# Patient Record
Sex: FEMALE | Race: WHITE | NOT HISPANIC OR LATINO | Employment: OTHER | URBAN - METROPOLITAN AREA
[De-identification: names, ages, dates, MRNs, and addresses within clinical notes are randomized per-mention and may not be internally consistent; named-entity substitution may affect disease eponyms.]

---

## 2024-08-05 ENCOUNTER — HOSPITAL ENCOUNTER (OUTPATIENT)
Facility: MEDICAL CENTER | Age: 74
End: 2024-08-05
Attending: ORTHOPAEDIC SURGERY | Admitting: ORTHOPAEDIC SURGERY
Payer: MEDICARE

## 2024-08-05 DIAGNOSIS — G89.18 POST-OPERATIVE PAIN: ICD-10-CM

## 2024-08-05 DIAGNOSIS — I10 HYPERTENSION, UNSPECIFIED TYPE: ICD-10-CM

## 2024-08-05 DIAGNOSIS — M17.11 PRIMARY OSTEOARTHRITIS OF RIGHT KNEE: ICD-10-CM

## 2024-08-05 DIAGNOSIS — I48.91 ATRIAL FIBRILLATION WITH RVR (HCC): ICD-10-CM

## 2024-08-05 DIAGNOSIS — I26.99 BILATERAL PULMONARY EMBOLISM (HCC): ICD-10-CM

## 2024-08-05 DIAGNOSIS — F17.200 TOBACCO DEPENDENCE: ICD-10-CM

## 2024-08-05 PROBLEM — M17.9 OSTEOARTHRITIS, KNEE: Status: ACTIVE | Noted: 2024-08-05

## 2024-08-05 NOTE — LETTER
August 22, 2024    Patient Name: Josie Vale  Surgeon Name: Federico Byrd M.D.  Surgery Facility: [facility] ([address] [place])  Surgery Date: 9/3/2024    The time of your surgery is not final and may change up to and until the day of your surgery. You will be contacted 24-48 hours prior to your surgery date with your check-in and surgery time.    If you will not be at one of the below numbers please call the surgery scheduler at [phone]  Preferred Phone: [phone]    BEFORE YOUR SURGERY   Pre Registration and/or Lab Work must be done within and no earlier than 28 days prior to your surgery date. Your scheduled facility will contact you regarding all required preregistration and/or lab work. If you have not been contacted within 7 days of your scheduled procedure please call [facility] at [phone] for an appointment as soon as possible.    DAY OF YOUR SURGERY  Nothing to eat or drink after midnight     Refrain from smoking any substance after midnight prior to surgery. Smoking may interfere with the anesthetic and frequently produces nausea during the recovery period.    Continue taking all lifesaving medications. Including the morning of your surgery with small sip of water.    Please do NOT take on the day of surgery:  Diuretics: examples- furosemide (Lasix), spironolactone, hydrochlorothiazide  ACE-inhibitors: examples- lisinopril, ramipril, enalapril  “ARBs”: examples- losartan, Olmesartan, valsartan    Please arrive at the hospital/surgery center at the check-in time provided.     An adult will need to bring you and take you home after your surgery.     AFTER YOUR SURGERY  Post op Appointment:   Date: 09/18/2024   Time: 08:45AM   With: Chad Castellanos PA-C   Location: [address] [place] [address] [place] [address]    - Therapy- Your first appointment should be 4-5  day(s) after your surgery. For your convenience we have 4 Physical Therapy  locations: [facility], [place], and [facility]. Call our office ASAP to schedule an appointment at [phone] or take the enclosed Therapy Prescription to a facility of your choice.  - No dental work for 3-6 months after your surgery.  - Post Surgery - You will need someone to drive you home  - Post Surgery - You will need someone to stay with you for 24 hours  - You must have someone provide transportation post surgery and someone to monitor you for at least 24 hours post-surgery. If you don't have either of these your appointment will be canceled.     TIME OFF WORK  FMLA or Disability forms can be faxed directly to: [phone] or you may drop them off at [address] [place] [address]. Our office charges a $35.00 fee per form. Forms will be completed within 10 business days of drop off and payment received. For the status of your forms you may contact our disability office directly [phone].    MEDICATION INSTRUCTIONS **Please read section completely**  The following medications should be stopped a minimum of 10 days prior to surgery:  All over the counter, Supplements & Herbal medications    Anorectics: Phentermine (Adipex-P, Lomaira and Suprenza), Phentermine-topiramate (Qsymia), Bupropion-naltrexone (Contrave)    **If you are on Bupropion for anxiety/depression, please continue this medication up until the day of surgery.     Opiod Partial Agonists/Opioid Antagonists: Buprenorphine (Suboxone, Belbuca, Butrans, Probuphine Implant, Sublocade), Naltrexone (ReVia, Vivitrol), Naloxone    Amphetamines: Dextroamphetamine/Amphetamine (Adderall, Mydayis), Methylphenidate Hydrochloride (Concerta, Metadate, Methylin, Ritalin)    The following medications should be stopped 5 days prior to surgery:  Blood Thinners: Any Aspirin, Aspirin products, anti-inflammatories such as ibuprofen and any blood thinners such as Coumadin and Plavix. Please consult your prescribing physician if you are on life  saving blood thinners, in regards to when to stop medications prior to surgery.     The following medications should be stopped a minimum of 3 days prior to surgery:  PDE-5 inhibitors: Sildenafil (Viagra), Tadalafil (Cialis), Vardenafil (Levitra), Avanafil (Stendra)    MAO Inhibitors: Rasagiline (Azilect), Selegiline (Eldepryl, Emsam, Selapar), Isocarboxazid (Marplan), Phenelzine (Nardil)

## 2024-08-22 PROBLEM — M17.11 PRIMARY OSTEOARTHRITIS OF RIGHT KNEE: Status: ACTIVE | Noted: 2024-08-05

## 2024-08-23 ENCOUNTER — APPOINTMENT (OUTPATIENT)
Dept: ADMISSIONS | Facility: MEDICAL CENTER | Age: 74
End: 2024-08-23
Attending: ORTHOPAEDIC SURGERY
Payer: MEDICARE

## 2024-08-28 ENCOUNTER — APPOINTMENT (OUTPATIENT)
Dept: ADMISSIONS | Facility: MEDICAL CENTER | Age: 74
End: 2024-08-28
Attending: ORTHOPAEDIC SURGERY
Payer: MEDICARE

## 2024-08-28 ENCOUNTER — PRE-ADMISSION TESTING (OUTPATIENT)
Dept: ADMISSIONS | Facility: MEDICAL CENTER | Age: 74
DRG: 469 | End: 2024-08-28
Attending: ORTHOPAEDIC SURGERY
Payer: MEDICARE

## 2024-08-28 VITALS — BODY MASS INDEX: 30.77 KG/M2 | HEIGHT: 72 IN

## 2024-08-28 DIAGNOSIS — Z01.812 PRE-OPERATIVE LABORATORY EXAMINATION: ICD-10-CM

## 2024-08-28 LAB
ERYTHROCYTE [DISTWIDTH] IN BLOOD BY AUTOMATED COUNT: 45.1 FL (ref 35.9–50)
HCT VFR BLD AUTO: 48 % (ref 37–47)
HGB BLD-MCNC: 16 G/DL (ref 12–16)
MCH RBC QN AUTO: 29.3 PG (ref 27–33)
MCHC RBC AUTO-ENTMCNC: 33.3 G/DL (ref 32.2–35.5)
MCV RBC AUTO: 87.9 FL (ref 81.4–97.8)
PLATELET # BLD AUTO: 168 K/UL (ref 164–446)
PMV BLD AUTO: 10.7 FL (ref 9–12.9)
RBC # BLD AUTO: 5.46 M/UL (ref 4.2–5.4)
SCCMEC + MECA PNL NOSE NAA+PROBE: NEGATIVE
SCCMEC + MECA PNL NOSE NAA+PROBE: NEGATIVE
WBC # BLD AUTO: 11.4 K/UL (ref 4.8–10.8)

## 2024-08-28 PROCEDURE — 87641 MR-STAPH DNA AMP PROBE: CPT

## 2024-08-28 PROCEDURE — 36415 COLL VENOUS BLD VENIPUNCTURE: CPT

## 2024-08-28 PROCEDURE — 85027 COMPLETE CBC AUTOMATED: CPT

## 2024-08-28 PROCEDURE — 87640 STAPH A DNA AMP PROBE: CPT | Mod: XU

## 2024-08-28 RX ORDER — ACETAMINOPHEN 500 MG
500-1000 TABLET ORAL EVERY 6 HOURS PRN
COMMUNITY

## 2024-08-28 NOTE — PREPROCEDURE INSTRUCTIONS
PreAdmit Telephone Appointment: Reviewed the Preparing for your procedure handout with patient over the phone. Patient instructed per pharmacy guidelines regarding taking, holding or contacting provider for instructions on regularly prescribed medications before surgery. Instructed to take the following medications the day of surgery with a sip of water per pharmacy medication guidelines:  Tylenol, claritin    Confirmed with patient where to check in morning of surgery. Handouts/Brochure/Video emailed to patient.

## 2024-08-28 NOTE — DISCHARGE PLANNING
DISCHARGE PLANNING NOTE - TOTAL JOINT    Procedure: Procedure(s):  ROBOTIC RIGHT TOTAL KNEE ARTHROPLASTY  Procedure Date: 9/3/2024  Insurance: Payor: MEDICARE / Plan: MEDICARE PART A & B / Product Type: *No Product type* /    Equipment currently available at home?  cane, crutches, front-wheel walker, raised toilet seat, shower chair, and ice machine  Steps into the home? 2  Steps within the home? 0  Toilet height? Standard  Type of shower? walk-in shower  Home Oxygen? No  Portable tank?    Oxygen Provider:  Who will be with you during your recovery? spouse  Is Outpatient Physical Therapy set up after surgery? Yes  Did you take the Total Joint Class and where? Yes, received NAON book.  Planning same day discharge?Yes     This writer met with pt and educated to preop showers, nasal mrsa swab which was obtained by this writer, and potential for overnight stay. CHG kit given to pt. Home safety checklist sent home with pt. Pt educated to dc criteria. All questions answered and verbalizes understanding of all instructions. No dc needs identified at this time. Anticipate dc to home without barriers.

## 2024-08-29 RX ORDER — DOCUSATE SODIUM 100 MG/1
100 CAPSULE, LIQUID FILLED ORAL 2 TIMES DAILY
Qty: 60 CAPSULE | Refills: 0 | Status: SHIPPED | OUTPATIENT
Start: 2024-08-29 | End: 2024-09-28

## 2024-08-29 RX ORDER — MELOXICAM 7.5 MG/1
7.5 TABLET ORAL DAILY
Qty: 30 TABLET | Refills: 0 | Status: SHIPPED | OUTPATIENT
Start: 2024-08-29 | End: 2024-09-28

## 2024-08-29 RX ORDER — ASPIRIN 81 MG/1
81 TABLET, CHEWABLE ORAL 2 TIMES DAILY
Qty: 56 TABLET | Refills: 0
Start: 2024-08-29 | End: 2024-09-26

## 2024-08-29 RX ORDER — OXYCODONE HYDROCHLORIDE 5 MG/1
5-7.5 TABLET ORAL EVERY 6 HOURS PRN
Qty: 42 TABLET | Refills: 0 | Status: SHIPPED | OUTPATIENT
Start: 2024-08-29 | End: 2024-09-05

## 2024-08-29 RX ORDER — TRAMADOL HYDROCHLORIDE 50 MG/1
50-75 TABLET ORAL EVERY 6 HOURS PRN
Qty: 35 TABLET | Refills: 0 | Status: SHIPPED | OUTPATIENT
Start: 2024-08-29 | End: 2024-09-05

## 2024-08-29 NOTE — DISCHARGE INSTRUCTIONS
Patient will be discharged home and follow up with Dr. Byrd in 2 weeks, for which the patient already has scheduled.    You may call or text Dr. Byrd' medical assistant during business hours at [phone].  You may call the emergency ADDY line during nights/weekends/holidays if needed at [phone].    Instructions:  -Leave the bandage in place until your followup appointment in 2 weeks.  -May shower with bandage in place, if bandage becomes soaked underneath please remove and call the office immediately.  -No baths/tubs/pools/submersion of the wound for now.  -Call if there is significant drainage beneath the bandage    -Put as much weight as comfortable on the operative leg.  Use a walker to assist with balance to avoid any falls.  -It is important to start moving and stretching right away, otherwise the joint can stiffen.    -Take your blood clot prevention medication for 4 weeks after surgery (81 mg of Aspirin, twice daily).  -Use ice frequently to help with pain and swelling control  -Pain management:  Standard pain regimen should be:  Ice as much as possible.  Apply the ice anywhere you feel pain.  Meloxicam (anti-inflammatory)- 7.5mg one tablet every day.  Take this automatically on a scheduled basis.  Do not take any other NSAIDs while taking this.  Tylenol (acetaminophen) - 1000mg every 8 hours. Take this automatically on a scheduled basis.  Tramadol - 1-2 capsules every 6 hours. Take this automatically on a scheduled basis until no longer needed for pain.  Oxycodone - 1-2 tablets every 6 hours only if needed.  You would take the oxycodone 3 hours after the Tramadol, such that you are taking one or the other every 3 hours        -Try to limit the amount of oxycodone since it tends to cause constipation, drowsiness, etc.  But if you need it, take it.        -100mg of Colace (docusate) will be prescribed. Take this twice a day while still taking Tramadol or Oxycodone. Can discontinue after opiates  are no longer being taken.  If bowel movement has not occurred in 48 hours please add in a laxative called Senna (over the counter) twice daily in conjunction with the colace. If no bowel movement is produced 48 hours after adding in Senna please start Milk of Magnesia (over the counter).     AGGRESSIVE RANGE OF MOTION.  Do a quad set a million times per day.  Have a family member push down on the thigh and shin every 20 minutes with 2-3 pillows under the heel to help stretch the knee straight.  Flex the knee as far as possible every 20 minutes.

## 2024-09-03 ENCOUNTER — ANESTHESIA (OUTPATIENT)
Dept: SURGERY | Facility: MEDICAL CENTER | Age: 74
DRG: 469 | End: 2024-09-03
Payer: MEDICARE

## 2024-09-03 ENCOUNTER — ANESTHESIA EVENT (OUTPATIENT)
Dept: SURGERY | Facility: MEDICAL CENTER | Age: 74
DRG: 469 | End: 2024-09-03
Payer: MEDICARE

## 2024-09-03 ENCOUNTER — APPOINTMENT (OUTPATIENT)
Dept: CARDIOLOGY | Facility: MEDICAL CENTER | Age: 74
DRG: 469 | End: 2024-09-03
Attending: INTERNAL MEDICINE
Payer: MEDICARE

## 2024-09-03 PROBLEM — R00.1 BRADYCARDIA: Status: ACTIVE | Noted: 2024-09-03

## 2024-09-03 PROBLEM — Z96.651 TOTAL KNEE REPLACEMENT STATUS, RIGHT: Status: ACTIVE | Noted: 2024-09-03

## 2024-09-03 PROBLEM — E87.1 HYPONATREMIA: Status: ACTIVE | Noted: 2024-09-03

## 2024-09-03 PROBLEM — D72.829 LEUKOCYTOSIS: Status: ACTIVE | Noted: 2024-09-03

## 2024-09-03 PROBLEM — R79.89 ELEVATED BRAIN NATRIURETIC PEPTIDE (BNP) LEVEL: Status: ACTIVE | Noted: 2024-09-03

## 2024-09-03 LAB
ANION GAP SERPL CALC-SCNC: 13 MMOL/L (ref 7–16)
BUN SERPL-MCNC: 16 MG/DL (ref 8–22)
CALCIUM SERPL-MCNC: 8.9 MG/DL (ref 8.4–10.2)
CHLORIDE SERPL-SCNC: 96 MMOL/L (ref 96–112)
CO2 SERPL-SCNC: 23 MMOL/L (ref 20–33)
CREAT SERPL-MCNC: 0.7 MG/DL (ref 0.5–1.4)
EKG IMPRESSION: NORMAL
EKG IMPRESSION: NORMAL
ERYTHROCYTE [DISTWIDTH] IN BLOOD BY AUTOMATED COUNT: 45 FL (ref 35.9–50)
GFR SERPLBLD CREATININE-BSD FMLA CKD-EPI: 91 ML/MIN/1.73 M 2
GLUCOSE SERPL-MCNC: 94 MG/DL (ref 65–99)
HCT VFR BLD AUTO: 40.3 % (ref 37–47)
HGB BLD-MCNC: 13.5 G/DL (ref 12–16)
MAGNESIUM SERPL-MCNC: 1.7 MG/DL (ref 1.5–2.5)
MCH RBC QN AUTO: 29.8 PG (ref 27–33)
MCHC RBC AUTO-ENTMCNC: 33.5 G/DL (ref 32.2–35.5)
MCV RBC AUTO: 89 FL (ref 81.4–97.8)
NT-PROBNP SERPL IA-MCNC: 1089 PG/ML (ref 0–125)
PHOSPHATE SERPL-MCNC: 3.9 MG/DL (ref 2.5–4.5)
PLATELET # BLD AUTO: 210 K/UL (ref 164–446)
PMV BLD AUTO: 9.8 FL (ref 9–12.9)
POTASSIUM SERPL-SCNC: 4.2 MMOL/L (ref 3.6–5.5)
RBC # BLD AUTO: 4.53 M/UL (ref 4.2–5.4)
SODIUM SERPL-SCNC: 132 MMOL/L (ref 135–145)
TROPONIN T SERPL-MCNC: 13 NG/L (ref 6–19)
TSH SERPL DL<=0.005 MIU/L-ACNC: 0.52 UIU/ML (ref 0.38–5.33)
WBC # BLD AUTO: 17.1 K/UL (ref 4.8–10.8)

## 2024-09-03 PROCEDURE — 770020 HCHG ROOM/CARE - TELE (206)

## 2024-09-03 PROCEDURE — 700111 HCHG RX REV CODE 636 W/ 250 OVERRIDE (IP): Performed by: ORTHOPAEDIC SURGERY

## 2024-09-03 PROCEDURE — 85027 COMPLETE CBC AUTOMATED: CPT

## 2024-09-03 PROCEDURE — 27447 TOTAL KNEE ARTHROPLASTY: CPT | Mod: RT | Performed by: ORTHOPAEDIC SURGERY

## 2024-09-03 PROCEDURE — 0SRC0JA REPLACEMENT OF RIGHT KNEE JOINT WITH SYNTHETIC SUBSTITUTE, UNCEMENTED, OPEN APPROACH: ICD-10-PCS | Performed by: ORTHOPAEDIC SURGERY

## 2024-09-03 PROCEDURE — 502000 HCHG MISC OR IMPLANTS RC 0278: Performed by: ORTHOPAEDIC SURGERY

## 2024-09-03 PROCEDURE — 80048 BASIC METABOLIC PNL TOTAL CA: CPT

## 2024-09-03 PROCEDURE — 160031 HCHG SURGERY MINUTES - 1ST 30 MINS LEVEL 5: Performed by: ORTHOPAEDIC SURGERY

## 2024-09-03 PROCEDURE — 20985 CPTR-ASST DIR MS PX: CPT | Mod: ASROC,RT | Performed by: STUDENT IN AN ORGANIZED HEALTH CARE EDUCATION/TRAINING PROGRAM

## 2024-09-03 PROCEDURE — 502714 HCHG ROBOTIC SURGERY SERVICES: Performed by: ORTHOPAEDIC SURGERY

## 2024-09-03 PROCEDURE — 8E0Y0CZ ROBOTIC ASSISTED PROCEDURE OF LOWER EXTREMITY, OPEN APPROACH: ICD-10-PCS | Performed by: ORTHOPAEDIC SURGERY

## 2024-09-03 PROCEDURE — 93010 ELECTROCARDIOGRAM REPORT: CPT | Performed by: STUDENT IN AN ORGANIZED HEALTH CARE EDUCATION/TRAINING PROGRAM

## 2024-09-03 PROCEDURE — 700111 HCHG RX REV CODE 636 W/ 250 OVERRIDE (IP): Mod: JZ | Performed by: INTERNAL MEDICINE

## 2024-09-03 PROCEDURE — 84443 ASSAY THYROID STIM HORMONE: CPT

## 2024-09-03 PROCEDURE — 700102 HCHG RX REV CODE 250 W/ 637 OVERRIDE(OP): Performed by: ANESTHESIOLOGY

## 2024-09-03 PROCEDURE — 160009 HCHG ANES TIME/MIN: Performed by: ORTHOPAEDIC SURGERY

## 2024-09-03 PROCEDURE — 700101 HCHG RX REV CODE 250: Performed by: ANESTHESIOLOGY

## 2024-09-03 PROCEDURE — 83880 ASSAY OF NATRIURETIC PEPTIDE: CPT

## 2024-09-03 PROCEDURE — 93005 ELECTROCARDIOGRAM TRACING: CPT | Performed by: HOSPITALIST

## 2024-09-03 PROCEDURE — 700111 HCHG RX REV CODE 636 W/ 250 OVERRIDE (IP): Performed by: STUDENT IN AN ORGANIZED HEALTH CARE EDUCATION/TRAINING PROGRAM

## 2024-09-03 PROCEDURE — A9270 NON-COVERED ITEM OR SERVICE: HCPCS | Performed by: HOSPITALIST

## 2024-09-03 PROCEDURE — 93005 ELECTROCARDIOGRAM TRACING: CPT | Performed by: ANESTHESIOLOGY

## 2024-09-03 PROCEDURE — 700102 HCHG RX REV CODE 250 W/ 637 OVERRIDE(OP): Performed by: INTERNAL MEDICINE

## 2024-09-03 PROCEDURE — 700105 HCHG RX REV CODE 258: Performed by: ORTHOPAEDIC SURGERY

## 2024-09-03 PROCEDURE — A9270 NON-COVERED ITEM OR SERVICE: HCPCS | Performed by: INTERNAL MEDICINE

## 2024-09-03 PROCEDURE — 160035 HCHG PACU - 1ST 60 MINS PHASE I: Performed by: ORTHOPAEDIC SURGERY

## 2024-09-03 PROCEDURE — 700105 HCHG RX REV CODE 258: Performed by: INTERNAL MEDICINE

## 2024-09-03 PROCEDURE — 700102 HCHG RX REV CODE 250 W/ 637 OVERRIDE(OP): Performed by: ORTHOPAEDIC SURGERY

## 2024-09-03 PROCEDURE — 94760 N-INVAS EAR/PLS OXIMETRY 1: CPT

## 2024-09-03 PROCEDURE — 3E0T3BZ INTRODUCTION OF ANESTHETIC AGENT INTO PERIPHERAL NERVES AND PLEXI, PERCUTANEOUS APPROACH: ICD-10-PCS | Performed by: ORTHOPAEDIC SURGERY

## 2024-09-03 PROCEDURE — C1776 JOINT DEVICE (IMPLANTABLE): HCPCS | Performed by: ORTHOPAEDIC SURGERY

## 2024-09-03 PROCEDURE — 99204 OFFICE O/P NEW MOD 45 MIN: CPT | Mod: 25 | Performed by: HOSPITALIST

## 2024-09-03 PROCEDURE — 700111 HCHG RX REV CODE 636 W/ 250 OVERRIDE (IP): Mod: JZ | Performed by: ANESTHESIOLOGY

## 2024-09-03 PROCEDURE — C1713 ANCHOR/SCREW BN/BN,TIS/BN: HCPCS | Performed by: ORTHOPAEDIC SURGERY

## 2024-09-03 PROCEDURE — 96365 THER/PROPH/DIAG IV INF INIT: CPT

## 2024-09-03 PROCEDURE — 83735 ASSAY OF MAGNESIUM: CPT

## 2024-09-03 PROCEDURE — 20985 CPTR-ASST DIR MS PX: CPT | Mod: RT | Performed by: ORTHOPAEDIC SURGERY

## 2024-09-03 PROCEDURE — 99406 BEHAV CHNG SMOKING 3-10 MIN: CPT | Performed by: HOSPITALIST

## 2024-09-03 PROCEDURE — 160002 HCHG RECOVERY MINUTES (STAT): Performed by: ORTHOPAEDIC SURGERY

## 2024-09-03 PROCEDURE — 84100 ASSAY OF PHOSPHORUS: CPT

## 2024-09-03 PROCEDURE — 160048 HCHG OR STATISTICAL LEVEL 1-5: Performed by: ORTHOPAEDIC SURGERY

## 2024-09-03 PROCEDURE — 99222 1ST HOSP IP/OBS MODERATE 55: CPT | Performed by: INTERNAL MEDICINE

## 2024-09-03 PROCEDURE — A9270 NON-COVERED ITEM OR SERVICE: HCPCS | Performed by: ORTHOPAEDIC SURGERY

## 2024-09-03 PROCEDURE — 700102 HCHG RX REV CODE 250 W/ 637 OVERRIDE(OP): Performed by: HOSPITALIST

## 2024-09-03 PROCEDURE — 36415 COLL VENOUS BLD VENIPUNCTURE: CPT

## 2024-09-03 PROCEDURE — 27447 TOTAL KNEE ARTHROPLASTY: CPT | Mod: ASROC,RT | Performed by: STUDENT IN AN ORGANIZED HEALTH CARE EDUCATION/TRAINING PROGRAM

## 2024-09-03 PROCEDURE — A9270 NON-COVERED ITEM OR SERVICE: HCPCS | Performed by: ANESTHESIOLOGY

## 2024-09-03 PROCEDURE — 84484 ASSAY OF TROPONIN QUANT: CPT

## 2024-09-03 PROCEDURE — 160036 HCHG PACU - EA ADDL 30 MINS PHASE I: Performed by: ORTHOPAEDIC SURGERY

## 2024-09-03 PROCEDURE — 64447 NJX AA&/STRD FEMORAL NRV IMG: CPT | Performed by: ORTHOPAEDIC SURGERY

## 2024-09-03 PROCEDURE — 160042 HCHG SURGERY MINUTES - EA ADDL 1 MIN LEVEL 5: Performed by: ORTHOPAEDIC SURGERY

## 2024-09-03 PROCEDURE — 700101 HCHG RX REV CODE 250

## 2024-09-03 DEVICE — IMPLANTABLE DEVICE: Type: IMPLANTABLE DEVICE | Site: KNEE | Status: FUNCTIONAL

## 2024-09-03 DEVICE — BASEPLATE TIBIAL TRIATHLON TRITANIUM SIZE 4 (1EA): Type: IMPLANTABLE DEVICE | Site: KNEE | Status: FUNCTIONAL

## 2024-09-03 DEVICE — COMPONENT PATELLAR TRIATHLON TRITANIUM ASYMMETRIC METAL BACKED PATELLA A38X11MM (1EA): Type: IMPLANTABLE DEVICE | Site: KNEE | Status: FUNCTIONAL

## 2024-09-03 DEVICE — COMPONENT FEMORAL TRIATHLON CRUCIATE RETAIN RIGHT SIZE 4 (1EA): Type: IMPLANTABLE DEVICE | Site: KNEE | Status: FUNCTIONAL

## 2024-09-03 RX ORDER — HYDROMORPHONE HYDROCHLORIDE 1 MG/ML
0.1 INJECTION, SOLUTION INTRAMUSCULAR; INTRAVENOUS; SUBCUTANEOUS
Status: DISCONTINUED | OUTPATIENT
Start: 2024-09-03 | End: 2024-09-03 | Stop reason: HOSPADM

## 2024-09-03 RX ORDER — TRAMADOL HYDROCHLORIDE 50 MG/1
50 TABLET ORAL EVERY 4 HOURS PRN
Status: DISCONTINUED | OUTPATIENT
Start: 2024-09-03 | End: 2024-09-08 | Stop reason: HOSPADM

## 2024-09-03 RX ORDER — EPHEDRINE SULFATE 50 MG/ML
INJECTION, SOLUTION INTRAVENOUS PRN
Status: DISCONTINUED | OUTPATIENT
Start: 2024-09-03 | End: 2024-09-03 | Stop reason: SURG

## 2024-09-03 RX ORDER — OXYCODONE HCL 5 MG/5 ML
5 SOLUTION, ORAL ORAL
Status: DISCONTINUED | OUTPATIENT
Start: 2024-09-03 | End: 2024-09-03 | Stop reason: HOSPADM

## 2024-09-03 RX ORDER — ASPIRIN 81 MG/1
81 TABLET ORAL 2 TIMES DAILY
Status: DISCONTINUED | OUTPATIENT
Start: 2024-09-03 | End: 2024-09-05

## 2024-09-03 RX ORDER — METOPROLOL TARTRATE 1 MG/ML
2.5 INJECTION, SOLUTION INTRAVENOUS
Status: COMPLETED | OUTPATIENT
Start: 2024-09-03 | End: 2024-09-03

## 2024-09-03 RX ORDER — DEXAMETHASONE SODIUM PHOSPHATE 4 MG/ML
4 INJECTION, SOLUTION INTRA-ARTICULAR; INTRALESIONAL; INTRAMUSCULAR; INTRAVENOUS; SOFT TISSUE
Status: DISCONTINUED | OUTPATIENT
Start: 2024-09-03 | End: 2024-09-08 | Stop reason: HOSPADM

## 2024-09-03 RX ORDER — METOPROLOL TARTRATE 25 MG/1
25 TABLET, FILM COATED ORAL 2 TIMES DAILY
Status: DISCONTINUED | OUTPATIENT
Start: 2024-09-03 | End: 2024-09-04

## 2024-09-03 RX ORDER — HYDROMORPHONE HYDROCHLORIDE 1 MG/ML
0.5 INJECTION, SOLUTION INTRAMUSCULAR; INTRAVENOUS; SUBCUTANEOUS
Status: DISCONTINUED | OUTPATIENT
Start: 2024-09-03 | End: 2024-09-08 | Stop reason: HOSPADM

## 2024-09-03 RX ORDER — KETOROLAC TROMETHAMINE 15 MG/ML
15 INJECTION, SOLUTION INTRAMUSCULAR; INTRAVENOUS EVERY 6 HOURS
Status: DISCONTINUED | OUTPATIENT
Start: 2024-09-03 | End: 2024-09-05

## 2024-09-03 RX ORDER — ACETAMINOPHEN 500 MG
1000 TABLET ORAL EVERY 6 HOURS
Status: COMPLETED | OUTPATIENT
Start: 2024-09-03 | End: 2024-09-07

## 2024-09-03 RX ORDER — CELECOXIB 200 MG/1
200 CAPSULE ORAL ONCE
Status: COMPLETED | OUTPATIENT
Start: 2024-09-03 | End: 2024-09-03

## 2024-09-03 RX ORDER — METOPROLOL TARTRATE 25 MG/1
25 TABLET, FILM COATED ORAL ONCE
Status: DISCONTINUED | OUTPATIENT
Start: 2024-09-03 | End: 2024-09-03

## 2024-09-03 RX ORDER — SODIUM CHLORIDE, SODIUM LACTATE, POTASSIUM CHLORIDE, CALCIUM CHLORIDE 600; 310; 30; 20 MG/100ML; MG/100ML; MG/100ML; MG/100ML
INJECTION, SOLUTION INTRAVENOUS CONTINUOUS
Status: DISCONTINUED | OUTPATIENT
Start: 2024-09-03 | End: 2024-09-03 | Stop reason: HOSPADM

## 2024-09-03 RX ORDER — DEXTROSE MONOHYDRATE 50 MG/ML
INJECTION, SOLUTION INTRAVENOUS CONTINUOUS
Status: DISCONTINUED | OUTPATIENT
Start: 2024-09-03 | End: 2024-09-03

## 2024-09-03 RX ORDER — DIPHENHYDRAMINE HYDROCHLORIDE 50 MG/ML
25 INJECTION INTRAMUSCULAR; INTRAVENOUS EVERY 6 HOURS PRN
Status: DISCONTINUED | OUTPATIENT
Start: 2024-09-03 | End: 2024-09-08 | Stop reason: HOSPADM

## 2024-09-03 RX ORDER — METOPROLOL TARTRATE 1 MG/ML
INJECTION, SOLUTION INTRAVENOUS
Status: COMPLETED
Start: 2024-09-03 | End: 2024-09-03

## 2024-09-03 RX ORDER — IBUPROFEN 400 MG/1
800 TABLET, FILM COATED ORAL 3 TIMES DAILY PRN
Status: DISCONTINUED | OUTPATIENT
Start: 2024-09-06 | End: 2024-09-05

## 2024-09-03 RX ORDER — ACETAMINOPHEN 500 MG
1000 TABLET ORAL EVERY 6 HOURS PRN
Status: DISCONTINUED | OUTPATIENT
Start: 2024-09-08 | End: 2024-09-03

## 2024-09-03 RX ORDER — SCOLOPAMINE TRANSDERMAL SYSTEM 1 MG/1
1 PATCH, EXTENDED RELEASE TRANSDERMAL
Status: DISCONTINUED | OUTPATIENT
Start: 2024-09-03 | End: 2024-09-08 | Stop reason: HOSPADM

## 2024-09-03 RX ORDER — ACETAMINOPHEN 10 MG/ML
1000 INJECTION, SOLUTION INTRAVENOUS EVERY 6 HOURS
Status: DISCONTINUED | OUTPATIENT
Start: 2024-09-03 | End: 2024-09-03

## 2024-09-03 RX ORDER — HYDROMORPHONE HYDROCHLORIDE 1 MG/ML
0.2 INJECTION, SOLUTION INTRAMUSCULAR; INTRAVENOUS; SUBCUTANEOUS
Status: DISCONTINUED | OUTPATIENT
Start: 2024-09-03 | End: 2024-09-03 | Stop reason: HOSPADM

## 2024-09-03 RX ORDER — DOCUSATE SODIUM 100 MG/1
100 CAPSULE, LIQUID FILLED ORAL 2 TIMES DAILY
Status: DISCONTINUED | OUTPATIENT
Start: 2024-09-03 | End: 2024-09-08 | Stop reason: HOSPADM

## 2024-09-03 RX ORDER — ONDANSETRON 2 MG/ML
4 INJECTION INTRAMUSCULAR; INTRAVENOUS EVERY 4 HOURS PRN
Status: DISCONTINUED | OUTPATIENT
Start: 2024-09-03 | End: 2024-09-08 | Stop reason: HOSPADM

## 2024-09-03 RX ORDER — ACETAMINOPHEN 500 MG
1000 TABLET ORAL ONCE
Status: COMPLETED | OUTPATIENT
Start: 2024-09-03 | End: 2024-09-03

## 2024-09-03 RX ORDER — SODIUM CHLORIDE, SODIUM LACTATE, POTASSIUM CHLORIDE, CALCIUM CHLORIDE 600; 310; 30; 20 MG/100ML; MG/100ML; MG/100ML; MG/100ML
INJECTION, SOLUTION INTRAVENOUS CONTINUOUS
Status: ACTIVE | OUTPATIENT
Start: 2024-09-03 | End: 2024-09-03

## 2024-09-03 RX ORDER — HYDROMORPHONE HYDROCHLORIDE 2 MG/ML
INJECTION, SOLUTION INTRAMUSCULAR; INTRAVENOUS; SUBCUTANEOUS PRN
Status: DISCONTINUED | OUTPATIENT
Start: 2024-09-03 | End: 2024-09-03 | Stop reason: SURG

## 2024-09-03 RX ORDER — HYDROMORPHONE HYDROCHLORIDE 1 MG/ML
0.4 INJECTION, SOLUTION INTRAMUSCULAR; INTRAVENOUS; SUBCUTANEOUS
Status: DISCONTINUED | OUTPATIENT
Start: 2024-09-03 | End: 2024-09-03 | Stop reason: HOSPADM

## 2024-09-03 RX ORDER — EPINEPHRINE 1 MG/ML(1)
AMPUL (ML) INJECTION
Status: DISCONTINUED | OUTPATIENT
Start: 2024-09-03 | End: 2024-09-03 | Stop reason: HOSPADM

## 2024-09-03 RX ORDER — DEXAMETHASONE SODIUM PHOSPHATE 4 MG/ML
INJECTION, SOLUTION INTRA-ARTICULAR; INTRALESIONAL; INTRAMUSCULAR; INTRAVENOUS; SOFT TISSUE PRN
Status: DISCONTINUED | OUTPATIENT
Start: 2024-09-03 | End: 2024-09-03 | Stop reason: SURG

## 2024-09-03 RX ORDER — ACETAMINOPHEN 500 MG
1000 TABLET ORAL EVERY 6 HOURS
Status: DISCONTINUED | OUTPATIENT
Start: 2024-09-04 | End: 2024-09-03

## 2024-09-03 RX ORDER — ONDANSETRON 2 MG/ML
INJECTION INTRAMUSCULAR; INTRAVENOUS PRN
Status: DISCONTINUED | OUTPATIENT
Start: 2024-09-03 | End: 2024-09-03 | Stop reason: SURG

## 2024-09-03 RX ORDER — AMOXICILLIN 250 MG
1 CAPSULE ORAL NIGHTLY
Status: DISCONTINUED | OUTPATIENT
Start: 2024-09-03 | End: 2024-09-08 | Stop reason: HOSPADM

## 2024-09-03 RX ORDER — OXYCODONE HCL 5 MG/5 ML
10 SOLUTION, ORAL ORAL
Status: DISCONTINUED | OUTPATIENT
Start: 2024-09-03 | End: 2024-09-03 | Stop reason: HOSPADM

## 2024-09-03 RX ORDER — TRANEXAMIC ACID 100 MG/ML
INJECTION, SOLUTION INTRAVENOUS PRN
Status: DISCONTINUED | OUTPATIENT
Start: 2024-09-03 | End: 2024-09-03 | Stop reason: SURG

## 2024-09-03 RX ORDER — BUPIVACAINE HYDROCHLORIDE AND EPINEPHRINE 2.5; 5 MG/ML; UG/ML
INJECTION, SOLUTION EPIDURAL; INFILTRATION; INTRACAUDAL; PERINEURAL PRN
Status: DISCONTINUED | OUTPATIENT
Start: 2024-09-03 | End: 2024-09-03 | Stop reason: SURG

## 2024-09-03 RX ORDER — OXYCODONE HYDROCHLORIDE 10 MG/1
10 TABLET ORAL
Status: DISCONTINUED | OUTPATIENT
Start: 2024-09-03 | End: 2024-09-08 | Stop reason: HOSPADM

## 2024-09-03 RX ORDER — HALOPERIDOL 5 MG/ML
1 INJECTION INTRAMUSCULAR EVERY 6 HOURS PRN
Status: DISCONTINUED | OUTPATIENT
Start: 2024-09-03 | End: 2024-09-08 | Stop reason: HOSPADM

## 2024-09-03 RX ORDER — ACETAMINOPHEN 500 MG
1000 TABLET ORAL EVERY 6 HOURS PRN
Status: DISCONTINUED | OUTPATIENT
Start: 2024-09-08 | End: 2024-09-08 | Stop reason: HOSPADM

## 2024-09-03 RX ORDER — ASPIRIN 81 MG/1
81 TABLET, CHEWABLE ORAL ONCE
Status: COMPLETED | OUTPATIENT
Start: 2024-09-03 | End: 2024-09-03

## 2024-09-03 RX ORDER — NICOTINE 21 MG/24HR
1 PATCH, TRANSDERMAL 24 HOURS TRANSDERMAL
Status: DISCONTINUED | OUTPATIENT
Start: 2024-09-03 | End: 2024-09-08 | Stop reason: HOSPADM

## 2024-09-03 RX ORDER — METOPROLOL TARTRATE 1 MG/ML
2 INJECTION, SOLUTION INTRAVENOUS
Status: COMPLETED | OUTPATIENT
Start: 2024-09-03 | End: 2024-09-03

## 2024-09-03 RX ORDER — BISACODYL 10 MG
10 SUPPOSITORY, RECTAL RECTAL
Status: DISCONTINUED | OUTPATIENT
Start: 2024-09-03 | End: 2024-09-08 | Stop reason: HOSPADM

## 2024-09-03 RX ORDER — POLYETHYLENE GLYCOL 3350 17 G/17G
1 POWDER, FOR SOLUTION ORAL 2 TIMES DAILY PRN
Status: DISCONTINUED | OUTPATIENT
Start: 2024-09-03 | End: 2024-09-08 | Stop reason: HOSPADM

## 2024-09-03 RX ORDER — ROPIVACAINE HYDROCHLORIDE 5 MG/ML
INJECTION, SOLUTION EPIDURAL; INFILTRATION; PERINEURAL
Status: DISCONTINUED | OUTPATIENT
Start: 2024-09-03 | End: 2024-09-03 | Stop reason: HOSPADM

## 2024-09-03 RX ORDER — CEFAZOLIN SODIUM 1 G/3ML
2 INJECTION, POWDER, FOR SOLUTION INTRAMUSCULAR; INTRAVENOUS ONCE
Status: COMPLETED | OUTPATIENT
Start: 2024-09-03 | End: 2024-09-03

## 2024-09-03 RX ORDER — DIPHENHYDRAMINE HCL 25 MG
25 TABLET ORAL EVERY 6 HOURS PRN
Status: DISCONTINUED | OUTPATIENT
Start: 2024-09-03 | End: 2024-09-08 | Stop reason: HOSPADM

## 2024-09-03 RX ORDER — DIPHENHYDRAMINE HCL 25 MG
25 TABLET ORAL NIGHTLY PRN
Status: DISCONTINUED | OUTPATIENT
Start: 2024-09-04 | End: 2024-09-08 | Stop reason: HOSPADM

## 2024-09-03 RX ORDER — OXYCODONE HYDROCHLORIDE 5 MG/1
5 TABLET ORAL
Status: DISCONTINUED | OUTPATIENT
Start: 2024-09-03 | End: 2024-09-08 | Stop reason: HOSPADM

## 2024-09-03 RX ORDER — LIDOCAINE HYDROCHLORIDE 20 MG/ML
INJECTION, SOLUTION EPIDURAL; INFILTRATION; INTRACAUDAL; PERINEURAL PRN
Status: DISCONTINUED | OUTPATIENT
Start: 2024-09-03 | End: 2024-09-03 | Stop reason: SURG

## 2024-09-03 RX ORDER — HALOPERIDOL 5 MG/ML
1 INJECTION INTRAMUSCULAR
Status: DISCONTINUED | OUTPATIENT
Start: 2024-09-03 | End: 2024-09-03 | Stop reason: HOSPADM

## 2024-09-03 RX ORDER — METOPROLOL TARTRATE 1 MG/ML
3 INJECTION, SOLUTION INTRAVENOUS
Status: COMPLETED | OUTPATIENT
Start: 2024-09-03 | End: 2024-09-03

## 2024-09-03 RX ORDER — SODIUM PHOSPHATE,MONO-DIBASIC 19G-7G/118
1 ENEMA (ML) RECTAL
Status: DISCONTINUED | OUTPATIENT
Start: 2024-09-03 | End: 2024-09-08 | Stop reason: HOSPADM

## 2024-09-03 RX ORDER — KETOROLAC TROMETHAMINE 30 MG/ML
INJECTION, SOLUTION INTRAMUSCULAR; INTRAVENOUS
Status: DISCONTINUED | OUTPATIENT
Start: 2024-09-03 | End: 2024-09-03 | Stop reason: HOSPADM

## 2024-09-03 RX ORDER — ONDANSETRON 2 MG/ML
4 INJECTION INTRAMUSCULAR; INTRAVENOUS
Status: DISCONTINUED | OUTPATIENT
Start: 2024-09-03 | End: 2024-09-03 | Stop reason: HOSPADM

## 2024-09-03 RX ORDER — VANCOMYCIN HYDROCHLORIDE 1 G/20ML
INJECTION, POWDER, LYOPHILIZED, FOR SOLUTION INTRAVENOUS
Status: COMPLETED | OUTPATIENT
Start: 2024-09-03 | End: 2024-09-03

## 2024-09-03 RX ORDER — AMOXICILLIN 250 MG
1 CAPSULE ORAL
Status: DISCONTINUED | OUTPATIENT
Start: 2024-09-03 | End: 2024-09-08 | Stop reason: HOSPADM

## 2024-09-03 RX ORDER — LABETALOL HYDROCHLORIDE 5 MG/ML
5 INJECTION, SOLUTION INTRAVENOUS
Status: DISCONTINUED | OUTPATIENT
Start: 2024-09-03 | End: 2024-09-03 | Stop reason: HOSPADM

## 2024-09-03 RX ORDER — HYDRALAZINE HYDROCHLORIDE 20 MG/ML
5 INJECTION INTRAMUSCULAR; INTRAVENOUS
Status: DISCONTINUED | OUTPATIENT
Start: 2024-09-03 | End: 2024-09-03 | Stop reason: HOSPADM

## 2024-09-03 RX ADMIN — TRANEXAMIC ACID 1000 MG: 100 INJECTION, SOLUTION INTRAVENOUS at 09:36

## 2024-09-03 RX ADMIN — CEFAZOLIN 2 G: 2 INJECTION, POWDER, FOR SOLUTION INTRAMUSCULAR; INTRAVENOUS at 13:24

## 2024-09-03 RX ADMIN — NICOTINE TRANSDERMAL SYSTEM 21 MG: 21 PATCH, EXTENDED RELEASE TRANSDERMAL at 23:28

## 2024-09-03 RX ADMIN — CEFAZOLIN 2 G: 1 INJECTION, POWDER, FOR SOLUTION INTRAMUSCULAR; INTRAVENOUS at 08:48

## 2024-09-03 RX ADMIN — TRANEXAMIC ACID 1000 MG: 100 INJECTION, SOLUTION INTRAVENOUS at 08:48

## 2024-09-03 RX ADMIN — LIDOCAINE HYDROCHLORIDE 60 MG: 20 INJECTION, SOLUTION EPIDURAL; INFILTRATION; INTRACAUDAL at 08:48

## 2024-09-03 RX ADMIN — SENNOSIDES AND DOCUSATE SODIUM 1 TABLET: 50; 8.6 TABLET ORAL at 20:19

## 2024-09-03 RX ADMIN — KETOROLAC TROMETHAMINE 15 MG: 15 INJECTION, SOLUTION INTRAMUSCULAR; INTRAVENOUS at 23:28

## 2024-09-03 RX ADMIN — ASPIRIN 81 MG: 81 TABLET, COATED ORAL at 21:25

## 2024-09-03 RX ADMIN — FENTANYL CITRATE 50 MCG: 50 INJECTION, SOLUTION INTRAMUSCULAR; INTRAVENOUS at 08:59

## 2024-09-03 RX ADMIN — SODIUM CHLORIDE, POTASSIUM CHLORIDE, SODIUM LACTATE AND CALCIUM CHLORIDE: 600; 310; 30; 20 INJECTION, SOLUTION INTRAVENOUS at 08:43

## 2024-09-03 RX ADMIN — ACETAMINOPHEN 1000 MG: 500 TABLET ORAL at 23:28

## 2024-09-03 RX ADMIN — AMIODARONE HYDROCHLORIDE 1 MG/MIN: 1.8 INJECTION, SOLUTION INTRAVENOUS at 15:07

## 2024-09-03 RX ADMIN — PROPOFOL 160 MG: 10 INJECTION, EMULSION INTRAVENOUS at 08:48

## 2024-09-03 RX ADMIN — METOPROLOL TARTRATE 2 MG: 5 INJECTION INTRAVENOUS at 10:15

## 2024-09-03 RX ADMIN — METOPROLOL TARTRATE 2.5 MG: 5 INJECTION INTRAVENOUS at 11:10

## 2024-09-03 RX ADMIN — KETOROLAC TROMETHAMINE 15 MG: 15 INJECTION, SOLUTION INTRAMUSCULAR; INTRAVENOUS at 18:14

## 2024-09-03 RX ADMIN — FENTANYL CITRATE 50 MCG: 50 INJECTION, SOLUTION INTRAMUSCULAR; INTRAVENOUS at 08:55

## 2024-09-03 RX ADMIN — METOPROLOL TARTRATE 2.5 MG: 5 INJECTION INTRAVENOUS at 12:13

## 2024-09-03 RX ADMIN — HYDROMORPHONE HYDROCHLORIDE 1 MG: 2 INJECTION INTRAMUSCULAR; INTRAVENOUS; SUBCUTANEOUS at 09:01

## 2024-09-03 RX ADMIN — AMIODARONE HYDROCHLORIDE 150 MG: 1.5 INJECTION, SOLUTION INTRAVENOUS at 14:46

## 2024-09-03 RX ADMIN — ONDANSETRON 4 MG: 2 INJECTION INTRAMUSCULAR; INTRAVENOUS at 09:36

## 2024-09-03 RX ADMIN — FENTANYL CITRATE 50 MCG: 50 INJECTION, SOLUTION INTRAMUSCULAR; INTRAVENOUS at 08:52

## 2024-09-03 RX ADMIN — CELECOXIB 200 MG: 200 CAPSULE ORAL at 07:57

## 2024-09-03 RX ADMIN — BUPIVACAINE HYDROCHLORIDE AND EPINEPHRINE BITARTRATE 30 ML: 2.5; .0091 INJECTION, SOLUTION EPIDURAL; INFILTRATION; INTRACAUDAL; PERINEURAL at 08:27

## 2024-09-03 RX ADMIN — ASPIRIN 81 MG 81 MG: 81 TABLET ORAL at 14:46

## 2024-09-03 RX ADMIN — DEXTROSE MONOHYDRATE: 50 INJECTION, SOLUTION INTRAVENOUS at 14:44

## 2024-09-03 RX ADMIN — PROPOFOL 40 MG: 10 INJECTION, EMULSION INTRAVENOUS at 09:00

## 2024-09-03 RX ADMIN — DEXAMETHASONE SODIUM PHOSPHATE 4 MG: 4 INJECTION INTRA-ARTICULAR; INTRALESIONAL; INTRAMUSCULAR; INTRAVENOUS; SOFT TISSUE at 08:53

## 2024-09-03 RX ADMIN — METOPROLOL TARTRATE 2.5 MG: 5 INJECTION INTRAVENOUS at 11:41

## 2024-09-03 RX ADMIN — FENTANYL CITRATE 50 MCG: 50 INJECTION, SOLUTION INTRAMUSCULAR; INTRAVENOUS at 08:48

## 2024-09-03 RX ADMIN — METOPROLOL TARTRATE 25 MG: 25 TABLET, FILM COATED ORAL at 12:22

## 2024-09-03 RX ADMIN — METOPROLOL TARTRATE 2.5 MG: 5 INJECTION INTRAVENOUS at 10:31

## 2024-09-03 RX ADMIN — EPHEDRINE SULFATE 10 MG: 50 INJECTION, SOLUTION INTRAVENOUS at 09:36

## 2024-09-03 RX ADMIN — METOPROLOL TARTRATE 2 MG: 1 INJECTION, SOLUTION INTRAVENOUS at 10:15

## 2024-09-03 RX ADMIN — DOCUSATE SODIUM 100 MG: 100 CAPSULE, LIQUID FILLED ORAL at 18:14

## 2024-09-03 RX ADMIN — ACETAMINOPHEN 1000 MG: 500 TABLET ORAL at 07:57

## 2024-09-03 RX ADMIN — METOPROLOL TARTRATE 3 MG: 5 INJECTION INTRAVENOUS at 10:20

## 2024-09-03 RX ADMIN — ACETAMINOPHEN 1000 MG: 500 TABLET ORAL at 16:27

## 2024-09-03 SDOH — ECONOMIC STABILITY: TRANSPORTATION INSECURITY
IN THE PAST 12 MONTHS, HAS THE LACK OF TRANSPORTATION KEPT YOU FROM MEDICAL APPOINTMENTS OR FROM GETTING MEDICATIONS?: NO

## 2024-09-03 SDOH — ECONOMIC STABILITY: TRANSPORTATION INSECURITY
IN THE PAST 12 MONTHS, HAS LACK OF RELIABLE TRANSPORTATION KEPT YOU FROM MEDICAL APPOINTMENTS, MEETINGS, WORK OR FROM GETTING THINGS NEEDED FOR DAILY LIVING?: NO

## 2024-09-03 ASSESSMENT — COGNITIVE AND FUNCTIONAL STATUS - GENERAL
WALKING IN HOSPITAL ROOM: A LITTLE
CLIMB 3 TO 5 STEPS WITH RAILING: A LITTLE
SUGGESTED CMS G CODE MODIFIER MOBILITY: CJ
STANDING UP FROM CHAIR USING ARMS: A LITTLE
MOBILITY SCORE: 20
MOVING TO AND FROM BED TO CHAIR: A LITTLE
DRESSING REGULAR LOWER BODY CLOTHING: A LITTLE
DAILY ACTIVITIY SCORE: 23
SUGGESTED CMS G CODE MODIFIER DAILY ACTIVITY: CI

## 2024-09-03 ASSESSMENT — SOCIAL DETERMINANTS OF HEALTH (SDOH)
IN THE PAST 12 MONTHS, HAS THE ELECTRIC, GAS, OIL, OR WATER COMPANY THREATENED TO SHUT OFF SERVICE IN YOUR HOME?: NO
IN THE PAST 12 MONTHS, HAS THE ELECTRIC, GAS, OIL, OR WATER COMPANY THREATENED TO SHUT OFF SERVICE IN YOUR HOME?: NO
WITHIN THE PAST 12 MONTHS, THE FOOD YOU BOUGHT JUST DIDN'T LAST AND YOU DIDN'T HAVE MONEY TO GET MORE: NEVER TRUE
WITHIN THE PAST 12 MONTHS, YOU WORRIED THAT YOUR FOOD WOULD RUN OUT BEFORE YOU GOT THE MONEY TO BUY MORE: NEVER TRUE
WITHIN THE PAST 12 MONTHS, THE FOOD YOU BOUGHT JUST DIDN'T LAST AND YOU DIDN'T HAVE MONEY TO GET MORE: NEVER TRUE
WITHIN THE LAST YEAR, HAVE YOU BEEN AFRAID OF YOUR PARTNER OR EX-PARTNER?: NO
WITHIN THE PAST 12 MONTHS, YOU WORRIED THAT YOUR FOOD WOULD RUN OUT BEFORE YOU GOT THE MONEY TO BUY MORE: NEVER TRUE
WITHIN THE LAST YEAR, HAVE YOU BEEN KICKED, HIT, SLAPPED, OR OTHERWISE PHYSICALLY HURT BY YOUR PARTNER OR EX-PARTNER?: NO
WITHIN THE LAST YEAR, HAVE YOU BEEN HUMILIATED OR EMOTIONALLY ABUSED IN OTHER WAYS BY YOUR PARTNER OR EX-PARTNER?: NO
WITHIN THE LAST YEAR, HAVE TO BEEN RAPED OR FORCED TO HAVE ANY KIND OF SEXUAL ACTIVITY BY YOUR PARTNER OR EX-PARTNER?: NO

## 2024-09-03 ASSESSMENT — ENCOUNTER SYMPTOMS
BRUISES/BLEEDS EASILY: 0
VOMITING: 0
HEADACHES: 0
NAUSEA: 0
DOUBLE VISION: 0
INSOMNIA: 0
DIZZINESS: 0
FEVER: 0
PALPITATIONS: 0
SHORTNESS OF BREATH: 0
COUGH: 0
MYALGIAS: 0
DEPRESSION: 0
BLURRED VISION: 0
NECK PAIN: 0
SORE THROAT: 0
WEAKNESS: 0

## 2024-09-03 ASSESSMENT — LIFESTYLE VARIABLES
TOTAL SCORE: 0
EVER HAD A DRINK FIRST THING IN THE MORNING TO STEADY YOUR NERVES TO GET RID OF A HANGOVER: NO
HAVE YOU EVER FELT YOU SHOULD CUT DOWN ON YOUR DRINKING: NO
HOW MANY TIMES IN THE PAST YEAR HAVE YOU HAD 5 OR MORE DRINKS IN A DAY: 0
DOES PATIENT WANT TO STOP DRINKING: NO
EVER FELT BAD OR GUILTY ABOUT YOUR DRINKING: NO
TOTAL SCORE: 0
ON A TYPICAL DAY WHEN YOU DRINK ALCOHOL HOW MANY DRINKS DO YOU HAVE: 0
TOTAL SCORE: 0
ALCOHOL_USE: YES
CONSUMPTION TOTAL: NEGATIVE
AVERAGE NUMBER OF DAYS PER WEEK YOU HAVE A DRINK CONTAINING ALCOHOL: 1
HAVE PEOPLE ANNOYED YOU BY CRITICIZING YOUR DRINKING: NO

## 2024-09-03 ASSESSMENT — PAIN SCALES - GENERAL: PAIN_LEVEL: 1

## 2024-09-03 ASSESSMENT — PATIENT HEALTH QUESTIONNAIRE - PHQ9
SUM OF ALL RESPONSES TO PHQ9 QUESTIONS 1 AND 2: 0
2. FEELING DOWN, DEPRESSED, IRRITABLE, OR HOPELESS: NOT AT ALL
1. LITTLE INTEREST OR PLEASURE IN DOING THINGS: NOT AT ALL

## 2024-09-03 ASSESSMENT — PAIN DESCRIPTION - PAIN TYPE
TYPE: SURGICAL PAIN

## 2024-09-03 NOTE — OP REPORT
Preop Diagnosis: Advanced right knee arthritis  Postop Diagnosis:  Same  Procedure: Right total knee arthroplasty, Use of intraoperative robotic navigation for knee replacement  Surgeon: Dr. Federico Byrd MD  Assistant: Chad Castellanos PA-C  Anesthesia: Dr. Cohn. General + Adductor canal block  Estimated Blood Loss: 50cc  Drains: None  Complications: None    Implants: [employer] CR Cementless, size 4 femur, size 4 tibia, with a 10 mm insert and 38 oval patella.    Indications: Josie Vale is a 73 y.o. female who has had severe progressive knee pain that failed conservative management.  There were severe degenerative changes on radiographs.  We discussed the options and she was ultimately indicated for knee replacement.  We discussed the risk of bleeding, transfusion, pain, neurovascular injury, stiffness, fracture, infection, wound complication, loosening of the parts over time, blood clots, and medical complication.  No guarantees were made and she elected to proceed.    Pertinent Findings and Releases: There were severe degenerative changes with preoperative stiffness and flexion contracture.  At the end of the case, the knee easily came to full extension and flexed up to calf/thigh impingement with the arthrotomy closed.  The patella tracked centrally.    Informed consent and site marking were confirmed in preop.  An adductor canal block had been performed by the anesthesiologist, and then she was brought back to the OR where anesthesia was performed. Supine positioning was perfmored with all bony prominences well padded with a padded tourniquet on the thigh.  The extremity was prepped and draped in the usual sterile fashion. I performed a pre-procedure timeout to confirm we had the correct patient, side, site, procedure, and presence of necessary personal and equipment.  I confirmed that Ancef and tranexamic acid were given.  The tourniquet was then inflated.    A midline incision was made medial to  the tibial tubercle centered over patella taken down to the deep capsule of the knee. A short medial parapatellar arthrotomy was performed.  The fat pad was released. The patella was subluxated and the knee was flexed. The remnants of the anterior horn of the medial and lateral meniscus were removed.  Two pins were placed into the proximal tibia within the incision and two additional pins were placed into the femoral diaphysis through stab incisions proximal to the knee incision to dock the robotic sensors.  The knee was then registered and taken through a range of motion to gauge the ligament balance.  The surgical plan was then modified on the computer, and all of the bone cuts were made without any difficulties.  The knee was then tensed at 90 degrees and the meniscal remnants and posterior osteophytes were removed.  The posterior capsule was injected with a local anesthetic cocktail.  The tibia was then subluxed forward, sized, and punched in the appropriate amount of external rotation and mechanical alignment was verified using a drop lilliana. Trials were placed, the knee was reduced with a trial insert, it was taken through a range of motion, and found to be stable in the varus/valgus plane 0-30 degrees of flexion and the AP plane at 90 degrees of flexion. Attention was then turned to the patella. A symmetric resection was performed to recreate native patella height and appropriately sized. All extraneous osteophyte and synovium were removed.  With a trial in place, the patella tracked centrally using the no thumbs technique. All trial components were removed. The real components were impacted with a great press-fit.  The tourniquet was let down and hemostasis ensured. The real insert was placed. Stability and patellar tracking were excellent. The knee was then copiously irrigated. One gram of Vancomycin was left in the wound.  The arthrotomy was closed with number 2 running barbed suture, and the wound was closed  in layers. An occlusive silver dressing was applied and the patient was turned over to anesthesia in stable condition without any apparent intraoperative complications.    Plan:  WBAT with a walker (which will need to be provided if they do not already have one due to weakness, imbalance, and fall risk), PT/OT evaluation, Aspirin 81mg BID for 4 weeks for DVT prophylaxis, Leave dressing in place until followup.

## 2024-09-03 NOTE — OR NURSING
0953- Pt arrived to PACU, report received. Pt on 6L mask. R knee with dressing, CDI.  Toes pink and warm with cap refill < 3 sec.  Pulses +2, palpable.  Pt declines pain at this time.  Pt tolerating sips of water.     1008- pt with HRs into 180s.  Dr. Tay notified.      1015- Dr. Tay to bedside.  2mg metoprolol administered per MD order.     1020- 3mg metoprolol administered per MD order.      1031- 2.5mg metoprolol administered per MD order. EKG done.      1053- pts  called and updated.     1110- Metoprolol per order.     1122- Pt helped to ambulate to bedside commode.      1141- metoprolol per MD order.      1208- Dr. Carrington at bedside.      1225- Pt medicated with po metoprolol.     1231- Pt helped to ambulate to restroom.     1240- Report given to YULIA Lowe.     1335- report called to YULIA Wei.      1358- Pt taken to floor via transport, with all belongings and tele monitor.

## 2024-09-03 NOTE — ANESTHESIA POSTPROCEDURE EVALUATION
Patient: Josie Vale    Procedure Summary       Date: 09/03/24 Room / Location:  OR  / SURGERY [facility]    Anesthesia Start: 0843 Anesthesia Stop: 0955    Procedure: ROBOTIC RIGHT TOTAL KNEE ARTHROPLASTY (Right: Knee) Diagnosis:       Primary osteoarthritis of right knee      (Primary osteoarthritis of right knee [M17.11])    Surgeons: Federico Byrd M.D. Responsible Provider: Carlos Cohn M.D.    Anesthesia Type: general, peripheral nerve block ASA Status: 2            Final Anesthesia Type: general, peripheral nerve block  Last vitals  BP   Blood Pressure : 104/71    Temp   36.3 °C (97.3 °F)    Pulse   (!) 110   Resp   16    SpO2   95 %      Anesthesia Post Evaluation    Patient location during evaluation: PACU  Patient participation: complete - patient participated  Level of consciousness: awake and alert  Pain score: 1    Airway patency: patent  Anesthetic complications: no  Cardiovascular status: hemodynamically stable and tachycardic (new onset A. Fib)  Respiratory status: acceptable  Hydration status: euvolemic    PONV: none          There were no known notable events for this encounter.     Nurse Pain Score: 1 (NPRS)

## 2024-09-03 NOTE — ANESTHESIA PREPROCEDURE EVALUATION
Case: [id number] Date/Time: 09/03/24 0845    Procedure: ROBOTIC RIGHT TOTAL KNEE ARTHROPLASTY    Diagnosis: Primary osteoarthritis of right knee [M17.11]    Pre-op diagnosis: Primary osteoarthritis of right knee [M17.11]    Location: [place] [address] / SURGERY [facility]    Surgeons: Federico Byrd M.D.            Relevant Problems   No relevant active problems   anxiety    Physical Exam    Airway   Mallampati: II  TM distance: >3 FB  Neck ROM: full       Cardiovascular - normal exam  Rhythm: regular  Rate: normal  (-) murmur     Dental - normal exam           Pulmonary - normal exam  Breath sounds clear to auscultation     Abdominal    Neurological - normal exam                   Anesthesia Plan    ASA 2       Plan - general and peripheral nerve block     Peripheral nerve block will be post-op pain control  Airway plan will be LMA          Induction: intravenous    Postoperative Plan: Postoperative administration of opioids is intended.    Pertinent diagnostic labs and testing reviewed    Informed Consent:    Anesthetic plan and risks discussed with patient.

## 2024-09-03 NOTE — CONSULTS
Cardiology Initial Consultation    Date of Service  9/3/2024    Referring Physician  Federico Byrd M.D.    Reason for Consultation  Postoperative atrial fibrillation    History of Presenting Illness  Josie Vale is a 73 y.o. female with a past medical history of osteoarthritis, anxiety who presented 9/3/2024 with elective knee replacement on the right side    She has been in her usual state of health presented for an elective knee replacement.  Ultimately she had an uncomplicated surgery but developed postoperative atrial fibrillation with rapid ventricular rate.  She was given metoprolol therapy while in the operating room and transitioned over to postanesthesia care.  Where she has remained in atrial fibrillation despite being given 12-1/2 mg of IV metoprolol.  Patient does not have any active cardiovascular complaints and questions largely centered on postoperative care for her right knee.    Bedside telemetry demonstrated atrial fibrillation with rapid ventricular rate.    EKG demonstrated atrial fibrillation with rapid ventricular rate    Review of Systems  Review of Systems    Past Medical History   has a past medical history of Anesthesia, Arthritis, Heart burn, Pain, Psychiatric problem, Tuberculosis, and Urinary incontinence.    Surgical History   has a past surgical history that includes tubal ligation (1982) and dilation and curettage (1985).    Family History  family history is not on file.    Social History   reports that she has been smoking cigarettes. She started smoking about 53 years ago. She has a 53.7 pack-year smoking history. She has never used smokeless tobacco. She reports current alcohol use. She reports that she does not use drugs.    Medications  Prior to Admission Medications   Prescriptions Last Dose Informant Patient Reported? Taking?   Calcium 200 MG Tab 8/27/2024  Yes No   Sig: Take  by mouth every day.       MEDICATION INSTRUCTIONS FOR SURGERY/PROCEDURE SCHEDULED FOR 9/3/24    DO NOT TAKE 7 DAYS PRIOR TO SURGERY   Omega-3 Fatty Acids (FISH OIL) 1000 MG Cap capsule 8/27/2024  Yes No   Sig: Take 1,000 mg by mouth 3 times a day with meals.       MEDICATION INSTRUCTIONS FOR SURGERY/PROCEDURE SCHEDULED FOR 9/3/24   DO NOT TAKE 7 DAYS PRIOR TO SURGERY   acetaminophen (TYLENOL) 500 MG Tab 9/2/2024  Yes Yes   Sig: Take 500-1,000 mg by mouth every 6 hours as needed.       MEDICATION INSTRUCTIONS FOR SURGERY/PROCEDURE SCHEDULED FOR 9/3/24   OK TO CONTINUE TAKING PRIOR TO SURGERY AND DAY OF SURGERY   coenzyme Q-10 30 MG capsule 8/27/2024  Yes No   Sig: Take 60 mg by mouth every day.       MEDICATION INSTRUCTIONS FOR SURGERY/PROCEDURE SCHEDULED FOR 9/3/24   DO NOT TAKE 7 DAYS PRIOR TO SURGERY   loratadine (CLARITIN) 10 MG Tab 9/2/2024  Yes Yes   Sig: Take 10 mg by mouth as needed.         MEDICATION INSTRUCTIONS FOR SURGERY/PROCEDURE SCHEDULED FOR 9/3/24   OK TO CONTINUE TAKING PRIOR TO SURGERY AND DAY OF SURGERY   multivitamin Tab 8/27/2024  Yes Yes   Sig: Take 1 Tablet by mouth every day.       MEDICATION INSTRUCTIONS FOR SURGERY/PROCEDURE SCHEDULED FOR 9/3/24   DO NOT TAKE 7 DAYS PRIOR TO SURGERY      Facility-Administered Medications: None       Allergies  Allergies   Allergen Reactions    Diclofenac Diarrhea     DIAHRREA    Seasonal Runny Nose     ITCHING, EYES, SNEEZING    Tumeric Diarrhea     DIARRHEA       Vital signs in last 24 hours  Temp:  [36 °C (96.8 °F)-36.3 °C (97.3 °F)] 36.3 °C (97.3 °F)  Pulse:  [] 125  Resp:  [11-59] 59  BP: ()/() 110/87  SpO2:  [91 %-95 %] 95 %    Physical Exam  Physical Exam  Vitals and nursing note reviewed.   Constitutional:       Appearance: Normal appearance.   HENT:      Head: Normocephalic and atraumatic.      Nose: Nose normal.      Mouth/Throat:      Mouth: Mucous membranes are moist.      Pharynx: Oropharynx is clear.   Eyes:      Pupils: Pupils are equal, round, and reactive to light.   Cardiovascular:      Rate and Rhythm:  "Tachycardia present. Rhythm irregular.      Heart sounds: No murmur heard.     No friction rub. No gallop.   Pulmonary:      Effort: Pulmonary effort is normal.      Breath sounds: Normal breath sounds.   Abdominal:      General: Bowel sounds are normal.      Palpations: Abdomen is soft.   Musculoskeletal:         General: Normal range of motion.      Cervical back: Normal range of motion and neck supple.      Right lower leg: No edema.      Left lower leg: No edema.      Comments: Right knee with surgical dressing.   Skin:     General: Skin is warm and dry.   Neurological:      General: No focal deficit present.      Mental Status: She is alert and oriented to person, place, and time. Mental status is at baseline.   Psychiatric:         Mood and Affect: Mood normal.         Behavior: Behavior normal.         Thought Content: Thought content normal.         Judgment: Judgment normal.         Lab Review  Lab Results   Component Value Date/Time    WBC 11.4 (H) 08/28/2024 03:29 PM    RBC 5.46 (H) 08/28/2024 03:29 PM    HEMOGLOBIN 16.0 08/28/2024 03:29 PM    HEMATOCRIT 48.0 (H) 08/28/2024 03:29 PM    MCV 87.9 08/28/2024 03:29 PM    MCH 29.3 08/28/2024 03:29 PM    MCHC 33.3 08/28/2024 03:29 PM    MPV 10.7 08/28/2024 03:29 PM      No results found for: \"SODIUM\", \"POTASSIUM\", \"CHLORIDE\", \"CO2\", \"GLUCOSE\", \"BUN\", \"CREATININE\", \"BUNCREATRAT\", \"GLOMRATE\"   No results found for: \"ASTSGOT\", \"ALTSGPT\"  No results found for: \"CHOLSTRLTOT\", \"LDL\", \"HDL\", \"TRIGLYCERIDE\", \"TROPONINT\"    No results for input(s): \"NTPROBNP\" in the last 72 hours.    Cardiac Imaging and Procedures Review  EKG: Atrial fibrillation with rapid ventricular rate    Echocardiogram: Pending        Assessment/Plan  1 postoperative atrial fibrillation  2.  Osteoarthritis status post right knee replacement  3.  Anxiety  4.  Ongoing tobacco use    Clinically, she is doing fair and is currently postoperative day 1 from elective right knee replacement ultimately " found to have postoperative atrial fibrillation with rapid ventricular rate.  At this time Lopressor 25 mg twice daily and will start amiodarone with bolus dosing.  I suspect that she will likely convert back out in the next 24 hours given that this is her first formal diagnosis of atrial fibrillation with likely an inciting event being her surgery we will check an echocardiogram but not unreasonable to consider anticoagulation given likely limited mobility as she continues to rehab from her recent knee replacement.  I will discuss with Dr. Byrd regarding timing of anticoagulation    In terms of other medical conditions we will defer to the surgical team      Thank you for allowing me to participate in the care of this patient.    I will continue to follow this patient    Please contact me with any questions.    Gerald Carrington D.O.   Cardiologist, [facility]  [phone]

## 2024-09-03 NOTE — ANESTHESIA TIME REPORT
Anesthesia Start and Stop Event Times       Date Time Event    9/3/2024 0828 Ready for Procedure     0843 Anesthesia Start     0955 Anesthesia Stop          Responsible Staff  09/03/24      Name Role Begin End    Carlos Cohn M.D. Anesth 0843 0955          Overtime Reason:  no overtime (within assigned shift)    Comments:

## 2024-09-03 NOTE — ANESTHESIA PROCEDURE NOTES
Peripheral Block    Date/Time: 9/3/2024 8:22 AM    Performed by: Carlos Cohn M.D.  Authorized by: Carlos Cohn M.D.    Patient Location:  Pre-op  Start Time:  9/3/2024 8:22 AM  End Time:  9/3/2024 8:27 AM  Reason for Block: at surgeon's request and post-op pain management ONLY    patient identified, IV checked, site marked, risks and benefits discussed, surgical consent, monitors and equipment checked, pre-op evaluation and timeout performed    Patient Position:  Supine  Prep: ChloraPrep    Monitoring:  Heart rate, continuous pulse ox and cardiac monitor  Block Region:  Lower Extremity  Lower Extremity - Block Type:  Selective FEMORAL nerve block at the Adductor Canal    Laterality:  Right  Procedures: ultrasound guided  Image captured, interpreted and electronically stored.  Local Infiltration:  Lidocaine  Strength:  1 %  Dose:  3 ml  Block Type:  Single-shot  Needle Length:  100mm  Needle Gauge:  21 G  Needle Localization:  Ultrasound guidance  Ultrasound picture in chart  Injection Assessment:  Negative aspiration for heme, no paresthesia on injection, incremental injection and local visualized surrounding nerve on ultrasound  Evidence of intravascular injection: No     US Guided Selective Femoral Nerve Block at Adductor Canal:   US probe placed at mid-thigh level on externally rotated leg and femur identified.  Probe directed medially until Sartorius Muscle (SM), Femoral Artery (FA) and Saphenous Nerve (SN) identified in Adductor Canal (AC).  Needle inserted anterolateral to probe in an in plane approach into a subsartorial perivascular perineural position.  After negative aspiration LA injected with ease and visualized spreading within the AC.

## 2024-09-03 NOTE — ANESTHESIA PROCEDURE NOTES
Airway    Date/Time: 9/3/2024 8:49 AM    Performed by: Carlos Cohn M.D.  Authorized by: Carlos Cohn M.D.    Location:  OR  Urgency:  Elective  Indications for Airway Management:  Anesthesia      Spontaneous Ventilation: absent    Sedation Level:  Deep  Preoxygenated: Yes    Mask Difficulty Assessment:  2 - vent by mask + OA or adjuvant +/- NMBA  Final Airway Type:  Supraglottic airway  Final Supraglottic Airway:  Standard LMA    SGA Size:  3  Number of Attempts at Approach:  1

## 2024-09-03 NOTE — H&P
"Surgery Orthopedic History & Physical Note    Date  9/3/2024    Primary Care Physician  Pcp Pt States None    CC  Pre-Op Diagnosis Codes:      * Primary osteoarthritis of right knee [M17.11]    HPI  This is a 73 y.o. female who presents for a TKA.    Past Medical History:   Diagnosis Date    Anesthesia     With sedation can get \"wired up\"    Arthritis     osteo-right knee    Heart burn     Pain     right knee    Psychiatric problem     anxiety    Tuberculosis     Pos skin test only    Urinary incontinence     Stress incontinence, wears pads       Past Surgical History:   Procedure Laterality Date    DILATION AND CURETTAGE  1985    TUBAL LIGATION  1982       Current Facility-Administered Medications   Medication Dose Route Frequency Provider Last Rate Last Admin    celecoxib (CeleBREX) capsule 200 mg  200 mg Oral Once Carlos Cohn M.D.        acetaminophen (Tylenol) tablet 1,000 mg  1,000 mg Oral Once Carlos Cohn M.D.        lidocaine (Xylocaine) 1 % injection 0.5 mL  0.5 mL Intradermal Once PRN Federico Byrd M.D.        lactated ringers infusion   Intravenous Continuous Federico Byrd M.D.        ceFAZolin (Ancef) injection 2 g  2 g Intravenous Once Chad Castellanos P.A.-C.           Social History     Socioeconomic History    Marital status:      Spouse name: Not on file    Number of children: Not on file    Years of education: Not on file    Highest education level: Not on file   Occupational History    Not on file   Tobacco Use    Smoking status: Every Day     Current packs/day: 1.00     Average packs/day: 1 pack/day for 53.7 years (53.7 ttl pk-yrs)     Types: Cigarettes     Start date: 1971    Smokeless tobacco: Never   Vaping Use    Vaping status: Never Used   Substance and Sexual Activity    Alcohol use: Yes     Comment: 1 per week    Drug use: Never    Sexual activity: Not on file   Other Topics Concern    Not on file   Social History Narrative    Not on file     Social Determinants of Health "     Financial Resource Strain: Not on file   Food Insecurity: Not on file   Transportation Needs: Not on file   Physical Activity: Not on file   Stress: Not on file   Social Connections: Not on file   Intimate Partner Violence: Not on file   Housing Stability: Not on file       History reviewed. No pertinent family history.    Allergies  Diclofenac, Seasonal, and Tumeric    Review of Systems  Negative    Physical Exam  Regular rate and rhythm  Nonlabored breathing  Abdomen soft and nontender   Neurovascular intact distally  Skin is in good condition    Vital Signs  Blood Pressure : (!) 191/95   Temperature: 36 °C (96.8 °F)   Pulse: 75   Respiration: 18   Pulse Oximetry: 93 %       Labs:                    Radiology:  No orders to display         Assessment/Plan:  Pre-Op Diagnosis Codes:      * Primary osteoarthritis of right knee [M17.11]  Procedure(s):  ROBOTIC RIGHT TOTAL KNEE ARTHROPLASTY

## 2024-09-04 ENCOUNTER — APPOINTMENT (OUTPATIENT)
Dept: CARDIOLOGY | Facility: MEDICAL CENTER | Age: 74
DRG: 469 | End: 2024-09-04
Attending: INTERNAL MEDICINE
Payer: MEDICARE

## 2024-09-04 PROBLEM — I48.91 ATRIAL FIBRILLATION WITH RVR (HCC): Status: ACTIVE | Noted: 2024-09-04

## 2024-09-04 PROBLEM — G47.09 OTHER INSOMNIA: Status: ACTIVE | Noted: 2024-09-04

## 2024-09-04 PROBLEM — Z71.6 ENCOUNTER FOR SMOKING CESSATION COUNSELING: Status: ACTIVE | Noted: 2024-09-04

## 2024-09-04 PROBLEM — M17.9 OSTEOARTHRITIS, KNEE: Status: RESOLVED | Noted: 2024-08-05 | Resolved: 2024-09-04

## 2024-09-04 PROBLEM — M17.11 PRIMARY OSTEOARTHRITIS OF RIGHT KNEE: Status: RESOLVED | Noted: 2024-08-05 | Resolved: 2024-09-04

## 2024-09-04 LAB
ANION GAP SERPL CALC-SCNC: 14 MMOL/L (ref 7–16)
BUN SERPL-MCNC: 15 MG/DL (ref 8–22)
CALCIUM SERPL-MCNC: 9 MG/DL (ref 8.4–10.2)
CHLORIDE SERPL-SCNC: 96 MMOL/L (ref 96–112)
CO2 SERPL-SCNC: 24 MMOL/L (ref 20–33)
CREAT SERPL-MCNC: 0.69 MG/DL (ref 0.5–1.4)
EKG IMPRESSION: NORMAL
ERYTHROCYTE [DISTWIDTH] IN BLOOD BY AUTOMATED COUNT: 45.8 FL (ref 35.9–50)
GFR SERPLBLD CREATININE-BSD FMLA CKD-EPI: 92 ML/MIN/1.73 M 2
GLUCOSE SERPL-MCNC: 100 MG/DL (ref 65–99)
HCT VFR BLD AUTO: 40 % (ref 37–47)
HGB BLD-MCNC: 13.2 G/DL (ref 12–16)
LV EJECT FRACT  99904: 70
LV EJECT FRACT MOD 2C 99903: 73.1
LV EJECT FRACT MOD 4C 99902: 69.5
LV EJECT FRACT MOD BP 99901: 70.5
MCH RBC QN AUTO: 29.7 PG (ref 27–33)
MCHC RBC AUTO-ENTMCNC: 33 G/DL (ref 32.2–35.5)
MCV RBC AUTO: 89.9 FL (ref 81.4–97.8)
PLATELET # BLD AUTO: 188 K/UL (ref 164–446)
PMV BLD AUTO: 10.2 FL (ref 9–12.9)
POTASSIUM SERPL-SCNC: 4 MMOL/L (ref 3.6–5.5)
RBC # BLD AUTO: 4.45 M/UL (ref 4.2–5.4)
SODIUM SERPL-SCNC: 134 MMOL/L (ref 135–145)
WBC # BLD AUTO: 15.8 K/UL (ref 4.8–10.8)

## 2024-09-04 PROCEDURE — 700111 HCHG RX REV CODE 636 W/ 250 OVERRIDE (IP): Mod: JZ | Performed by: ORTHOPAEDIC SURGERY

## 2024-09-04 PROCEDURE — 93306 TTE W/DOPPLER COMPLETE: CPT

## 2024-09-04 PROCEDURE — 700102 HCHG RX REV CODE 250 W/ 637 OVERRIDE(OP): Performed by: INTERNAL MEDICINE

## 2024-09-04 PROCEDURE — A9270 NON-COVERED ITEM OR SERVICE: HCPCS | Performed by: INTERNAL MEDICINE

## 2024-09-04 PROCEDURE — 700102 HCHG RX REV CODE 250 W/ 637 OVERRIDE(OP): Performed by: ORTHOPAEDIC SURGERY

## 2024-09-04 PROCEDURE — 99232 SBSQ HOSP IP/OBS MODERATE 35: CPT | Performed by: INTERNAL MEDICINE

## 2024-09-04 PROCEDURE — 36415 COLL VENOUS BLD VENIPUNCTURE: CPT

## 2024-09-04 PROCEDURE — 97535 SELF CARE MNGMENT TRAINING: CPT

## 2024-09-04 PROCEDURE — 93010 ELECTROCARDIOGRAM REPORT: CPT | Performed by: STUDENT IN AN ORGANIZED HEALTH CARE EDUCATION/TRAINING PROGRAM

## 2024-09-04 PROCEDURE — 700111 HCHG RX REV CODE 636 W/ 250 OVERRIDE (IP): Mod: JZ | Performed by: INTERNAL MEDICINE

## 2024-09-04 PROCEDURE — A9270 NON-COVERED ITEM OR SERVICE: HCPCS | Performed by: ORTHOPAEDIC SURGERY

## 2024-09-04 PROCEDURE — 99024 POSTOP FOLLOW-UP VISIT: CPT | Performed by: ORTHOPAEDIC SURGERY

## 2024-09-04 PROCEDURE — 85027 COMPLETE CBC AUTOMATED: CPT

## 2024-09-04 PROCEDURE — 80048 BASIC METABOLIC PNL TOTAL CA: CPT

## 2024-09-04 PROCEDURE — 97162 PT EVAL MOD COMPLEX 30 MIN: CPT

## 2024-09-04 PROCEDURE — 97110 THERAPEUTIC EXERCISES: CPT

## 2024-09-04 PROCEDURE — 93306 TTE W/DOPPLER COMPLETE: CPT | Mod: 26 | Performed by: INTERNAL MEDICINE

## 2024-09-04 PROCEDURE — 99233 SBSQ HOSP IP/OBS HIGH 50: CPT | Performed by: INTERNAL MEDICINE

## 2024-09-04 PROCEDURE — 94760 N-INVAS EAR/PLS OXIMETRY 1: CPT

## 2024-09-04 PROCEDURE — 97165 OT EVAL LOW COMPLEX 30 MIN: CPT

## 2024-09-04 PROCEDURE — 770020 HCHG ROOM/CARE - TELE (206)

## 2024-09-04 RX ORDER — CLONIDINE HYDROCHLORIDE 0.1 MG/1
0.1 TABLET ORAL EVERY 6 HOURS PRN
Status: DISCONTINUED | OUTPATIENT
Start: 2024-09-04 | End: 2024-09-08 | Stop reason: HOSPADM

## 2024-09-04 RX ORDER — HYDROCHLOROTHIAZIDE 25 MG/1
25 TABLET ORAL
Status: DISCONTINUED | OUTPATIENT
Start: 2024-09-04 | End: 2024-09-08 | Stop reason: HOSPADM

## 2024-09-04 RX ORDER — TRAZODONE HYDROCHLORIDE 50 MG/1
50 TABLET, FILM COATED ORAL
Status: DISCONTINUED | OUTPATIENT
Start: 2024-09-04 | End: 2024-09-08 | Stop reason: HOSPADM

## 2024-09-04 RX ORDER — HYDRALAZINE HYDROCHLORIDE 20 MG/ML
20 INJECTION INTRAMUSCULAR; INTRAVENOUS ONCE
Status: COMPLETED | OUTPATIENT
Start: 2024-09-04 | End: 2024-09-04

## 2024-09-04 RX ADMIN — KETOROLAC TROMETHAMINE 15 MG: 15 INJECTION, SOLUTION INTRAMUSCULAR; INTRAVENOUS at 23:14

## 2024-09-04 RX ADMIN — ASPIRIN 81 MG: 81 TABLET, COATED ORAL at 04:40

## 2024-09-04 RX ADMIN — HYDRALAZINE HYDROCHLORIDE 20 MG: 20 INJECTION, SOLUTION INTRAMUSCULAR; INTRAVENOUS at 10:23

## 2024-09-04 RX ADMIN — ASPIRIN 81 MG: 81 TABLET, COATED ORAL at 16:50

## 2024-09-04 RX ADMIN — TRAZODONE HYDROCHLORIDE 50 MG: 50 TABLET ORAL at 20:59

## 2024-09-04 RX ADMIN — DOCUSATE SODIUM 100 MG: 100 CAPSULE, LIQUID FILLED ORAL at 04:40

## 2024-09-04 RX ADMIN — ACETAMINOPHEN 1000 MG: 500 TABLET ORAL at 23:15

## 2024-09-04 RX ADMIN — KETOROLAC TROMETHAMINE 15 MG: 15 INJECTION, SOLUTION INTRAMUSCULAR; INTRAVENOUS at 16:51

## 2024-09-04 RX ADMIN — CLONIDINE HYDROCHLORIDE 0.1 MG: 0.1 TABLET ORAL at 19:51

## 2024-09-04 RX ADMIN — HYDROCHLOROTHIAZIDE 25 MG: 25 TABLET ORAL at 12:49

## 2024-09-04 RX ADMIN — OXYCODONE HYDROCHLORIDE 10 MG: 10 TABLET ORAL at 11:43

## 2024-09-04 RX ADMIN — DOCUSATE SODIUM 100 MG: 100 CAPSULE, LIQUID FILLED ORAL at 16:51

## 2024-09-04 RX ADMIN — SENNOSIDES AND DOCUSATE SODIUM 1 TABLET: 50; 8.6 TABLET ORAL at 20:59

## 2024-09-04 RX ADMIN — KETOROLAC TROMETHAMINE 15 MG: 15 INJECTION, SOLUTION INTRAMUSCULAR; INTRAVENOUS at 04:41

## 2024-09-04 RX ADMIN — ACETAMINOPHEN 1000 MG: 500 TABLET ORAL at 04:40

## 2024-09-04 RX ADMIN — ACETAMINOPHEN 1000 MG: 500 TABLET ORAL at 16:51

## 2024-09-04 RX ADMIN — KETOROLAC TROMETHAMINE 15 MG: 15 INJECTION, SOLUTION INTRAMUSCULAR; INTRAVENOUS at 12:50

## 2024-09-04 RX ADMIN — OXYCODONE HYDROCHLORIDE 5 MG: 5 TABLET ORAL at 19:51

## 2024-09-04 RX ADMIN — ACETAMINOPHEN 1000 MG: 500 TABLET ORAL at 12:49

## 2024-09-04 ASSESSMENT — PAIN DESCRIPTION - PAIN TYPE
TYPE: SURGICAL PAIN
TYPE: ACUTE PAIN;SURGICAL PAIN
TYPE: SURGICAL PAIN
TYPE: ACUTE PAIN;SURGICAL PAIN

## 2024-09-04 ASSESSMENT — ENCOUNTER SYMPTOMS
SHORTNESS OF BREATH: 0
CHILLS: 0
PALPITATIONS: 0
FEVER: 0
HEADACHES: 0
ABDOMINAL PAIN: 0
DIZZINESS: 0
NAUSEA: 0
INSOMNIA: 1
VOMITING: 0

## 2024-09-04 ASSESSMENT — GAIT ASSESSMENTS
DEVIATION: DECREASED HEEL STRIKE;DECREASED TOE OFF
DISTANCE (FEET): 100
ASSISTIVE DEVICE: FRONT WHEEL WALKER
DISTANCE (FEET): 25
GAIT LEVEL OF ASSIST: STANDBY ASSIST

## 2024-09-04 ASSESSMENT — COGNITIVE AND FUNCTIONAL STATUS - GENERAL
STANDING UP FROM CHAIR USING ARMS: A LITTLE
MOVING TO AND FROM BED TO CHAIR: A LITTLE
MOVING FROM LYING ON BACK TO SITTING ON SIDE OF FLAT BED: A LITTLE
MOBILITY SCORE: 18
SUGGESTED CMS G CODE MODIFIER MOBILITY: CK
HELP NEEDED FOR BATHING: A LITTLE
DAILY ACTIVITIY SCORE: 20
DRESSING REGULAR LOWER BODY CLOTHING: A LITTLE
WALKING IN HOSPITAL ROOM: A LITTLE
TOILETING: A LITTLE
CLIMB 3 TO 5 STEPS WITH RAILING: A LITTLE
PERSONAL GROOMING: A LITTLE
SUGGESTED CMS G CODE MODIFIER DAILY ACTIVITY: CJ
TURNING FROM BACK TO SIDE WHILE IN FLAT BAD: A LITTLE

## 2024-09-04 ASSESSMENT — ACTIVITIES OF DAILY LIVING (ADL): TOILETING: INDEPENDENT

## 2024-09-04 NOTE — PROGRESS NOTES
2153- Monitor tech informed this RN of patient sustaining HR in the 40s. This RN went into assess patient. Patient is currently resting in bed. Breathing unlabored and currently having no complaints of discomfort. Attempted to call Dr. Byrd per office will call back.     2157- Dr. Byrd called and informed this RN that hospitalist will be consulted.    2240- Dr. Melton at bedside. Orders placed for EKG and labs. MD reviewed EKG at bedside. Patient awake and alert with no complaints of discomfort.     0137- RN informed by monitor tech patient sustaining HR 37-39 for 4-5 minutes. Notified Dr. Melton. Orders to repeat EKG if HR sustains less then 35.

## 2024-09-04 NOTE — PROGRESS NOTES
Received report from Kayla BENDER.     Bed is locked and in lowest position. Fall and Safety precautions in place. Reviewed POC. Call light within reach. Patient verbalizes No other needs at this time.

## 2024-09-04 NOTE — PROGRESS NOTES
4 Eyes Skin Assessment Completed by te RN and YULIA redmond.    Head WDL  Ears WDL  Nose WDL  Mouth WDL  Neck WDL  Breast/Chest WDL  Shoulder Blades WDL  Spine WDL  (R) Arm/Elbow/Hand WDL  (L) Arm/Elbow/Hand WDL  Abdomen WDL  Groin WDL  Scrotum/Coccyx/Buttocks Redness and Blanching  (R) Leg surgical dressing  (L) Leg WDL  (R) Heel/Foot/Toe WDL  (L) Heel/Foot/Toe WDL          Devices In Places Tele Box and Nasal Cannula      Interventions In Place Gray Ear Foams    Possible Skin Injury No    Pictures Uploaded Into Epic No, needs to be completed  Wound Consult Placed N/A  RN Wound Prevention Protocol Ordered No

## 2024-09-04 NOTE — ASSESSMENT & PLAN NOTE
Rate remains quite labile, overnight 9/5-9/6 she became bradycardic, overnight 9/6-9/7 she was tachycardic.  Now she is somewhat bradycardia today  When she is tachycardic amiodarone boluses have not seem to impact significantly, every time she is started on beta-blocker she becomes bradycardic.

## 2024-09-04 NOTE — CARE PLAN
The patient is Watcher - Medium risk of patient condition declining or worsening    Shift Goals  Clinical Goals: HR control, ambulate, pain control  Patient Goals: Discharge, echo tomr  Family Goals: serenity    Progress made toward(s) clinical / shift goals:  Patient updated on POC with all concerns and questions addressed. Patient scheduled for echo tomorrow. Patients surgical pain controlled with current regimen. Patient remained free from falls throughout shift and calls prior to ambulating.      Problem: Knowledge Deficit - Standard  Goal: Patient and family/care givers will demonstrate understanding of plan of care, disease process/condition, diagnostic tests and medications  Outcome: Progressing     Problem: Pain - Standard  Goal: Alleviation of pain or a reduction in pain to the patient’s comfort goal  Outcome: Progressing     Problem: Fall Risk  Goal: Patient will remain free from falls  Outcome: Progressing     Patient is not progressing towards the following goals:

## 2024-09-04 NOTE — ASSESSMENT & PLAN NOTE
Patient tachycardic again overnight, amiodarone drip stopped yesterday due to bradycardia in the morning.  Increased diltiazem to 60 mg every 6 hours with hold below 55.  Discussed with cardiology recommended starting amiodarone drip however heart rate is now again between 50 and 40.  Therefore we will continue with oral amiodarone twice daily.  Unclear what to do given her extremes and heart rate.  This is the reason she is not able to leave the hospital, she intermittently reverts to normal sinus rhythm so cardioversion unlikely to make any impact

## 2024-09-04 NOTE — ASSESSMENT & PLAN NOTE
-Done 9/3/2024  Doing well with mobility, pain adequately controlled  Postop hemoglobin has been stable on Eliquis loading

## 2024-09-04 NOTE — PROGRESS NOTES
Telemetry Shift Summary     Rhythm SB   HR Range 41-55  Ectopy rPAC/rPVC  Measurements  0.20/0.08/0.44       Normal Values  Rhythm SR  HR Range    Measurements 0.12-0.20 / 0.06-0.10  / 0.30-0.52

## 2024-09-04 NOTE — PROGRESS NOTES
Telemetry Shift Summary     Rhythm sinus mikki  HR Range 44-60  Ectopy none  Measurements  0.18/0.10/0.42  Per strip printed 1600     Normal Values  Rhythm SR  HR Range    Measurements 0.12-0.20 / 0.06-0.10  / 0.30-0.52

## 2024-09-04 NOTE — ASSESSMENT & PLAN NOTE
proBNP 1089.  This could be elevated secondary to recent A-fib with RVR.  Does not appear to be volume overloaded has clear lungs

## 2024-09-04 NOTE — PROGRESS NOTES
Cardiology Follow Up Progress Note    Date of Service  9/4/2024    Attending Physician  NASIR Vang.*    Chief Complaint   Postoperative atrial fibrillation    HPI  Josie Vale is a 73 y.o. female with a past medical history of osteoarthritis, anxiety who presented 9/3/2024 with elective knee replacement on the right side found out postoperative atrial fibrillation    Interim Events  No acute overnight events.  Patient did have reversion to sinus bradycardia on 9/3/2024 in the early afternoon.  Since that time she has remained in sinus bradycardia and has not been on amiodarone therapy or beta-blocker therapy.  No acute events per nursing aside from patient has had elevated blood pressure    Review of Systems  Review of Systems    Vital signs in last 24 hours  Temp:  [36.3 °C (97.3 °F)-36.8 °C (98.3 °F)] 36.7 °C (98 °F)  Pulse:  [] 60  Resp:  [12-26] 18  BP: (101-191)/(55-82) 165/62  SpO2:  [89 %-95 %] 93 %    Physical Exam  Physical Exam  Vitals and nursing note reviewed.   Constitutional:       Appearance: Normal appearance.   HENT:      Head: Normocephalic and atraumatic.      Nose: Nose normal.      Mouth/Throat:      Mouth: Mucous membranes are moist.      Pharynx: Oropharynx is clear.   Eyes:      Pupils: Pupils are equal, round, and reactive to light.   Cardiovascular:      Rate and Rhythm: Normal rate and regular rhythm.      Heart sounds: No murmur heard.     No friction rub. No gallop.   Pulmonary:      Effort: Pulmonary effort is normal.      Breath sounds: Normal breath sounds.   Abdominal:      General: Bowel sounds are normal.      Palpations: Abdomen is soft.   Musculoskeletal:         General: Normal range of motion.      Cervical back: Normal range of motion and neck supple.      Comments: Right knee postsurgical bandaging   Skin:     General: Skin is warm and dry.   Neurological:      General: No focal deficit present.      Mental Status: She is alert and oriented to  "person, place, and time. Mental status is at baseline.   Psychiatric:         Mood and Affect: Mood normal.         Behavior: Behavior normal.         Thought Content: Thought content normal.         Judgment: Judgment normal.         Lab Review  Lab Results   Component Value Date/Time    WBC 15.8 (H) 09/04/2024 03:10 AM    RBC 4.45 09/04/2024 03:10 AM    HEMOGLOBIN 13.2 09/04/2024 03:10 AM    HEMATOCRIT 40.0 09/04/2024 03:10 AM    MCV 89.9 09/04/2024 03:10 AM    MCH 29.7 09/04/2024 03:10 AM    MCHC 33.0 09/04/2024 03:10 AM    MPV 10.2 09/04/2024 03:10 AM      Lab Results   Component Value Date/Time    SODIUM 134 (L) 09/04/2024 03:10 AM    POTASSIUM 4.0 09/04/2024 03:10 AM    CHLORIDE 96 09/04/2024 03:10 AM    CO2 24 09/04/2024 03:10 AM    GLUCOSE 100 (H) 09/04/2024 03:10 AM    BUN 15 09/04/2024 03:10 AM    CREATININE 0.69 09/04/2024 03:10 AM      No results found for: \"ASTSGOT\", \"ALTSGPT\"  Lab Results   Component Value Date/Time    TROPONINT 13 09/03/2024 11:02 PM       Recent Labs     09/03/24  2302   NTPROBNP 1089*       Cardiac Imaging and Procedures Review  Echocardiogram demonstrated normal LV systolic function with preserved ejection fraction estimated at approximately 70% no significant valvular disease        Assessment/Plan  1 postoperative atrial fibrillation  2.  Osteoarthritis status post right knee replacement  3.  Anxiety  4.  Ongoing tobacco use  5.  Elevated blood pressure reading suggestive of underlying essential hypertension    Clinically, she is doing well and has had reversion back to sinus bradycardia.  I suspect that her lone episode of atrial fibrillation was secondary to postoperative at this time.  I would not recommend anticoagulation beyond aspirin therapy which is in the purview of her postoperative care for her right knee replacement.    Regarding her elevated blood pressure readings not unreasonable to consider hydrochlorothiazide 25 mg daily.  Would be recommended that she abstain " from further tobacco exposure given that this can also cause elevations in her blood pressure.    In terms of other medical conditions I will defer to the primary medicine service.    At this time cardiology service will sign off.  Patient will need to establish with a primary care provider and may be seen in the cardiology department in the next 4 to 6 weeks with Dr. Carrington.  If you have any further questions please feel free to contact us we will be happy to reengage in the patient's care      Please contact me with any questions.    Gerald Carrington D.O.   Cardiologist, [facility]  [phone]

## 2024-09-04 NOTE — ASSESSMENT & PLAN NOTE
-Patient is a smoking for more than 50 years.  She states that she will maybe consider quit smoking at some point and I offered to give her nicotine patch which she accepted.  We talked about a few things she can try outpatient when she is ready to quit smoking.  Total amount of time allocated on tobacco smoking cessation was 4 minutes.

## 2024-09-04 NOTE — PROGRESS NOTES
Hospital Medicine Daily Progress Note    Date of Service  9/4/2024    Chief Complaint  Josie Vale is a 73 y.o. female admitted 9/3/2024 with initially A-fib and RVR postop right TKA, after being treated she developed significant persistent bradycardia.  She was also noted to have leukocytosis and hyponatremia patient did not sleep well last night blood pressure is elevated today heart rate in the low 50s    Hospital Course  Patient admitted to telemetry due to bradycardia following RVR.  She remained bradycardic overnight, beta-blockers were subsequently discontinued, she was seen by cardiology who have since signed off, echocardiogram overall unremarkable.      Interval Problem Update  patient did not sleep well last night blood pressure is elevated today heart rate in the low 50s, she states she is starting to get more pain in her right leg as the nerve block is starting to wear off.  Discussed plan of care with patient, spouse was at bedside, discussed with RN    I have discussed this patient's plan of care and discharge plan at IDT rounds today with Case Management, Nursing, Nursing leadership, and other members of the IDT team.    Consultants/Specialty  cardiology and orthopedics    Code Status  Full Code    Disposition  The patient is not medically cleared for discharge to home or a post-acute facility.  Anticipate discharge to: home with close outpatient follow-up    I have placed the appropriate orders for post-discharge needs.    Review of Systems  Review of Systems   Constitutional:  Negative for chills and fever.   Respiratory:  Negative for shortness of breath.    Cardiovascular:  Negative for chest pain and palpitations.   Gastrointestinal:  Negative for abdominal pain, nausea and vomiting.   Musculoskeletal:         Right leg pain   Neurological:  Negative for dizziness and headaches.   Psychiatric/Behavioral:  The patient has insomnia.         Physical Exam  Temp:  [36.3 °C (97.3 °F)-36.8 °C  (98.3 °F)] 36.7 °C (98 °F)  Pulse:  [41-96] 60  Resp:  [16-26] 18  BP: (115-191)/(55-82) 165/62  SpO2:  [89 %-95 %] 93 %    Physical Exam  Vitals and nursing note reviewed.   Constitutional:       General: She is not in acute distress.     Appearance: She is obese. She is not ill-appearing.   HENT:      Head: Normocephalic and atraumatic.      Nose: Nose normal.      Mouth/Throat:      Mouth: Mucous membranes are moist.   Eyes:      Extraocular Movements: Extraocular movements intact.      Pupils: Pupils are equal, round, and reactive to light.   Cardiovascular:      Rate and Rhythm: Regular rhythm. Bradycardia present.   Pulmonary:      Effort: Pulmonary effort is normal.      Breath sounds: Normal breath sounds.   Abdominal:      General: Bowel sounds are normal. There is no distension.      Palpations: Abdomen is soft.      Tenderness: There is no abdominal tenderness.   Musculoskeletal:      Right lower leg: No edema.      Left lower leg: No edema.      Comments: Right knee postoperative dressing   Skin:     General: Skin is warm and dry.   Neurological:      General: No focal deficit present.      Mental Status: She is alert and oriented to person, place, and time.      Cranial Nerves: No cranial nerve deficit.   Psychiatric:         Mood and Affect: Mood normal.         Behavior: Behavior normal.         Fluids    Intake/Output Summary (Last 24 hours) at 9/4/2024 1507  Last data filed at 9/3/2024 2032  Gross per 24 hour   Intake 1070 ml   Output --   Net 1070 ml        Laboratory  Recent Labs     09/03/24 2302 09/04/24  0310   WBC 17.1* 15.8*   RBC 4.53 4.45   HEMOGLOBIN 13.5 13.2   HEMATOCRIT 40.3 40.0   MCV 89.0 89.9   MCH 29.8 29.7   MCHC 33.5 33.0   RDW 45.0 45.8   PLATELETCT 210 188   MPV 9.8 10.2     Recent Labs     09/03/24 2302 09/04/24  0310   SODIUM 132* 134*   POTASSIUM 4.2 4.0   CHLORIDE 96 96   CO2 23 24   GLUCOSE 94 100*   BUN 16 15   CREATININE 0.70 0.69   CALCIUM 8.9 9.0                    Imaging  EC-ECHOCARDIOGRAM COMPLETE W/O CONT   Final Result           Assessment/Plan  * Bradycardia  Assessment & Plan  -Under the context of previously taking beta-blockers and amiodarone for A-fib with RVR, likely medication induced.  -Patient received total of 80 mg of IV metoprolol shortly postoperatively followed by 150 mg of IV amiodarone and short period of amiodarone infusion and oral 25 mg of p.o. metoprolol around 1230 to 1 PM.  Heart rate in the 50s, patient is hypertensive, beta-blockers have been discontinued.  Will give clonidine for blood pressure as hydralazine was ineffective    Other insomnia  Assessment & Plan  Patient's blood pressure is up today and she states that she is not feeling well due to not sleeping well preoperative and not sleeping at all last night due to frequent interruptions and noise.  She is agreeable with trying something tonight for sleep, will start her on trazodone    Atrial fibrillation with RVR (HCC)  Assessment & Plan  Resolved  Echo shows concentric LVH-mild with normal EF, RVSP 30 mm and left atrial size normal.  She is now bradycardic probably due to overmedication, continue to monitor beta-blockers have been discontinued    Encounter for smoking cessation counseling  Assessment & Plan  -Patient is a smoking for more than 50 years.  She states that she will maybe consider quit smoking at some point and I offered to give her nicotine patch which she accepted.  We talked about a few things she can try outpatient when she is ready to quit smoking.  Total amount of time allocated on tobacco smoking cessation was 4 minutes.    Elevated brain natriuretic peptide (BNP) level  Assessment & Plan  proBNP 1089.  This could be elevated secondary to recent A-fib with RVR.  Does not appear to be volume overloaded has clear lungs    Leukocytosis  Assessment & Plan  WBC 17.1, trending down 15.8, no fever, recheck tomorrow    Hyponatremia  Assessment & Plan  -Laboratory showing  sodium of 132.  134 today    Total knee replacement status, right- (present on admission)  Assessment & Plan  -Done 9/3/2024  -Plan per orthopedic team.  Regional block wearing off, patient trying to hold off on using opioids but understands she may need to in order to do her therapy         VTE prophylaxis:   SCDs/TEDs      I have performed a physical exam and reviewed and updated ROS and Plan today (9/4/2024). In review of yesterday's note (9/3/2024), there are no changes except as documented above.

## 2024-09-04 NOTE — PROGRESS NOTES
Telemetry Shift Summary    Rhythm SB/SR  HR Range 46-76  Ectopy rPVC/PAC  Measurements 0.16/0.10/0.45    Normal Values  Rhythm SR  HR Range:   Measurements: 0.12-0.20/0.06-0.10/0.30-0.52

## 2024-09-04 NOTE — PROGRESS NOTES
Subjective:    Afib postop, converted with medications, now bradycardic (asymptomatic).  Pain reasonably controlled.      Objective:  /55   Pulse (!) 47   Temp 36.3 °C (97.3 °F) (Oral)   Resp 18   Ht 1.829 m (6')   Wt 106 kg (233 lb 0.4 oz)   SpO2 92%     Recent Labs     09/03/24 2302 09/04/24  0310   WBC 17.1* 15.8*   RBC 4.53 4.45   HEMOGLOBIN 13.5 13.2   HEMATOCRIT 40.3 40.0   MCV 89.0 89.9   MCH 29.8 29.7   MCHC 33.5 33.0   RDW 45.0 45.8   PLATELETCT 210 188   MPV 9.8 10.2     Recent Labs     09/03/24 2302 09/04/24  0310   SODIUM 132* 134*   POTASSIUM 4.2 4.0   CHLORIDE 96 96   CO2 23 24   GLUCOSE 94 100*   BUN 16 15   CREATININE 0.70 0.69   CALCIUM 8.9 9.0         Intake/Output Summary (Last 24 hours) at 9/4/2024 0641  Last data filed at 9/3/2024 2032  Gross per 24 hour   Intake 3020 ml   Output 530 ml   Net 2490 ml       Comfortable, no distress  Neurologically and vascularly intact with palpable pedal pulses bilaterally.  Dressing C/D/I      Assessment:  Doing well s/p total knee arthroplasty.  New onset a fib    Plan:    Diet as tolerated  WBAT  PT/OT  Reviewed knee ROM including terminal flexion and extension exercises    Anticoagulation - we typically do aspirin 81mg BID after a TKA. If she needs something stronger due to the a fib, we'd usually wait 72 hours or so before starting that due to bleeding/hematoma risk.    Plan to discharge home when cleared by medicine and cardiology    Follow-up in approximately 2 weeks post-op.  [phone]

## 2024-09-04 NOTE — CARE PLAN
The patient is Stable - Low risk of patient condition declining or worsening    Shift Goals  Clinical Goals: pain managment, monitor hear rate, increase ambulation  Patient Goals: Get discharged, work with PT  Family Goals: serenity    Progress made toward(s) clinical / shift goals:    Problem: Knowledge Deficit - Standard  Goal: Patient and family/care givers will demonstrate understanding of plan of care, disease process/condition, diagnostic tests and medications  9/4/2024 1308 by Sobeidamiranda Pina, R.N.  Outcome: Progressing  Note: Reviewed POC; discussed and provided education on medications, monitoring vitals, increasing mobility, PT/OT involved, pain management, and safety.      Problem: Fall Risk  Goal: Patient will remain free from falls  9/4/2024 1308 by Sobeidamiranda Pina, R.N.  Outcome: Progressing  9/4/2024 1305 by Sobeidamiranda Pina, R.N.  Outcome: Progressing  Note: Fall precautions implemented. Patient free from falls.      Problem: Post-Operative Knee Replacement  Goal: Early mobilization post surgery  9/4/2024 1308 by Sobeida Silvana, R.N.  Outcome: Progressing  9/4/2024 1305 by Sobeida Silvana, R.N.  Outcome: Progressing  Note: Patient ambulated to hallway with PT.      Problem: Pain - Post Surgery  Goal: Alleviation or reduction of pain post surgery  9/4/2024 1308 by Sobeida Katarinapt, R.N.  Outcome: Progressing  9/4/2024 1305 by Sobeida Silvana, R.N.  Outcome: Progressing  Note: Discussed and provided education on pharmacological and non-pharmacological pain management interventions. Polar Ice utilized, PO and IV pain medications given per MAR orders.        Patient is not progressing towards the following goals:

## 2024-09-04 NOTE — CONSULTS
Hospital Medicine Consultation    Date of Service  9/3/2024    Referring Physician  Federico Byrd M.D.    Consulting Physician  Martha Wilson M.D.    Reason for Consultation  Bradycardia    History of Presenting Illness  73 y.o. female with h/o advanced right knee arthritis admitted earlier today for elective Right Total knee arthroplasty by Dr. Byrd today.      Postoperatively developed Afib with RVR. Patient denies any prior history of A Fib. She received 8 mg IV Metoprolol while in the OR and Orthopedic Service consulted with Cardiology (Dr. Carrington). Afib was attributed to surgery. Echocardiogram was ordered and considerations for anticoagulation not unreasonable given limited mobility.  Later in the Day, she was given Amiodarone 150 mg (at 14:46) and Amiodarone infusion for about 30 min. around 3 pm. She also received 25 mg PO Metoprolol tartrate at 12:22 pm.     Patient does not have h/o HTN.     Patient has been Bradycardic since around 7:30 pm, progressive worse touching bethany to 40 bpm now. Nursing Staff communicated with Orthopedic Team who requested formal consult. At the time of my evaluation, patient was sleeping. Denies CP, Dizziness. Her pain is controlled.       Review of Systems  Review of Systems   Constitutional:  Positive for malaise/fatigue. Negative for fever.   HENT:  Negative for congestion and sore throat.    Eyes:  Negative for blurred vision and double vision.   Respiratory:  Negative for cough and shortness of breath.    Cardiovascular:  Negative for chest pain and palpitations.   Gastrointestinal:  Negative for nausea and vomiting.   Genitourinary:  Negative for dysuria and urgency.   Musculoskeletal:  Negative for myalgias and neck pain.   Skin:  Negative for itching and rash.   Neurological:  Negative for dizziness, weakness and headaches.   Endo/Heme/Allergies:  Does not bruise/bleed easily.   Psychiatric/Behavioral:  Negative for depression. The patient does not have  insomnia.        Past Medical History   has a past medical history of Anesthesia, Arthritis, Heart burn, Pain, Psychiatric problem, Tuberculosis, and Urinary incontinence.    Surgical History   has a past surgical history that includes tubal ligation (1982); dilation and curettage (1985); and arthroplasty, knee, robot-assisted (Right, 9/3/2024).    Family History  Reviewed and not pertinent    Social History   reports that she has been smoking cigarettes. She started smoking about 53 years ago. She has a 53.7 pack-year smoking history. She has never used smokeless tobacco. She reports current alcohol use. She reports that she does not use drugs.    Medications  Prior to Admission Medications   Prescriptions Last Dose Informant Patient Reported? Taking?   Calcium 200 MG Tab 8/27/2024  Yes No   Sig: Take  by mouth every day.       MEDICATION INSTRUCTIONS FOR SURGERY/PROCEDURE SCHEDULED FOR 9/3/24   DO NOT TAKE 7 DAYS PRIOR TO SURGERY   Omega-3 Fatty Acids (FISH OIL) 1000 MG Cap capsule 8/27/2024  Yes No   Sig: Take 1,000 mg by mouth 3 times a day with meals.       MEDICATION INSTRUCTIONS FOR SURGERY/PROCEDURE SCHEDULED FOR 9/3/24   DO NOT TAKE 7 DAYS PRIOR TO SURGERY   acetaminophen (TYLENOL) 500 MG Tab 9/2/2024  Yes Yes   Sig: Take 500-1,000 mg by mouth every 6 hours as needed.       MEDICATION INSTRUCTIONS FOR SURGERY/PROCEDURE SCHEDULED FOR 9/3/24   OK TO CONTINUE TAKING PRIOR TO SURGERY AND DAY OF SURGERY   coenzyme Q-10 30 MG capsule 8/27/2024  Yes No   Sig: Take 60 mg by mouth every day.       MEDICATION INSTRUCTIONS FOR SURGERY/PROCEDURE SCHEDULED FOR 9/3/24   DO NOT TAKE 7 DAYS PRIOR TO SURGERY   loratadine (CLARITIN) 10 MG Tab 9/2/2024  Yes Yes   Sig: Take 10 mg by mouth as needed.         MEDICATION INSTRUCTIONS FOR SURGERY/PROCEDURE SCHEDULED FOR 9/3/24   OK TO CONTINUE TAKING PRIOR TO SURGERY AND DAY OF SURGERY   multivitamin Tab 8/27/2024  Yes Yes   Sig: Take 1 Tablet by mouth every day.       MEDICATION  INSTRUCTIONS FOR SURGERY/PROCEDURE SCHEDULED FOR 9/3/24   DO NOT TAKE 7 DAYS PRIOR TO SURGERY      Facility-Administered Medications: None       Allergies  Allergies   Allergen Reactions    Diclofenac Diarrhea     DIAHRREA    Seasonal Runny Nose     ITCHING, EYES, SNEEZING    Tumeric Diarrhea     DIARRHEA       Physical Exam  Temp:  [36 °C (96.8 °F)-36.8 °C (98.2 °F)] 36.7 °C (98.1 °F)  Pulse:  [] 50  Resp:  [11-40] 17  BP: ()/() 133/60  SpO2:  [91 %-95 %] 92 %    Physical Exam  Constitutional:       Appearance: Normal appearance.   HENT:      Head: Normocephalic and atraumatic.      Mouth/Throat:      Mouth: Mucous membranes are moist.      Pharynx: Oropharynx is clear.   Eyes:      Extraocular Movements: Extraocular movements intact.      Pupils: Pupils are equal, round, and reactive to light.   Cardiovascular:      Rate and Rhythm: Regular rhythm. Bradycardia present.      Heart sounds: Normal heart sounds. No murmur heard.  Pulmonary:      Effort: Pulmonary effort is normal.      Breath sounds: Normal breath sounds.   Abdominal:      General: Abdomen is flat. Bowel sounds are normal.      Palpations: Abdomen is soft.   Musculoskeletal:      Cervical back: Normal range of motion and neck supple.      Right lower leg: No edema.      Left lower leg: No edema.   Skin:     General: Skin is warm and dry.   Neurological:      General: No focal deficit present.      Mental Status: She is alert and oriented to person, place, and time.   Psychiatric:         Mood and Affect: Mood normal.         Behavior: Behavior normal.         Fluids  Date 09/03/24 0700 - 09/04/24 0659   Shift 5271-9310 1199-8964 2964-1986 24 Hour Total   INTAKE   P.O.  1070  1070   I.V. 1950   1950   Shift Total 1950 1070  3020   OUTPUT   Urine 500   500   Blood 30   30   Shift Total 530   530   Weight (kg) 106.3 106.3 106.3 106.3       Laboratory  Recent Labs     09/03/24  2302   WBC 17.1*   RBC 4.53   HEMOGLOBIN 13.5   HEMATOCRIT  40.3   MCV 89.0   MCH 29.8   MCHC 33.5   RDW 45.0   PLATELETCT 210   MPV 9.8                             Assessment/Plan  * Bradycardia  Assessment & Plan  -Under the context of previously taking beta-blockers and amiodarone for A-fib with RVR, likely medication induced.  -Patient received total of 80 mg of IV metoprolol shortly postoperatively followed by 150 mg of IV amiodarone and short period of amiodarone infusion and oral 25 mg of p.o. metoprolol around 1230 to 1 PM.  -Patient currently sustaining heart rate of 40 bpm.  -Her heart rate immediately improves after she wakes up.  -She is not symptomatic at this time and has normal blood pressure.  -I requested to do an EKG.  There is no blocks.  -I will add electrolytes panel, TSH check closely follow-up on telemetry unit overnight.  -This patient was evaluated by cardiology and there is an echocardiogram pending.  -Hospitalist team to closely monitor.    Atrial fibrillation with RVR (HCC)  Assessment & Plan  New onset and under the context of immediate postop.  -Patient received 8 mg of IV metoprolol, 150 mg of IV amiodarone followed by 30 minutes of amiodarone infusion and 25 mg of oral metoprolol in the day.  She is now bradycardic and I am holding her oral metoprolol for now.  -I added blood work including TSH which was normal.  -Closely monitor her heart rate.  -Pending echocardiogram in the morning she was evaluated by cardiology.    Elevated brain natriuretic peptide (BNP) level  Assessment & Plan  proBNP 1089.  This could be elevated secondary to recent A-fib with RVR.  -She is not severely fluid overloaded at this time and I will hold off ordering Lasix but this can be considered as a one-time dose.    Leukocytosis  Assessment & Plan  WBC 17.1, likely reactive within the context of recent surgery.  Monitor CBC in am. Consider adding     Hyponatremia  Assessment & Plan  -Laboratory showing sodium of 132.  This is likely reactive given recent  surgery.  -Follow-up repeat BMP in the morning.    Total knee replacement status, right- (present on admission)  Assessment & Plan  -Done earlier today.  -Plan per orthopedic team.  Continue pain control.  At this time, she is not in severe pain.    Encounter for smoking cessation counseling  Assessment & Plan  -Patient is a smoking for more than 50 years.  She states that she will maybe consider quit smoking at some point and I offered to give her nicotine patch which she accepted.  We talked about a few things she can try outpatient when she is ready to quit smoking.  Total amount of time allocated on tobacco smoking cessation was 4 minutes.

## 2024-09-04 NOTE — THERAPY
"Occupational Therapy   Initial Evaluation     Patient Name: Josie Vale  Age:  73 y.o., Sex:  female  Medical Record #: [id number]  Today's Date: 9/4/2024     Precautions  Precautions: Fall Risk, Weight Bearing As Tolerated Right Lower Extremity    Assessment  Patient is 73 y.o. female with a past medical history of osteoarthritis, anxiety who presented 9/3/2024 with elective knee replacement on the right side. She developed postoperative atrial fibrillation with rapid ventricular rate.  Pt tolerated bed mobility, basic self care tasks, ADL transfers and simple mobility with FWW in the room as expected on POD#1.  Pt demos fair safety awareness with transfers and ADL's.  Pt is agreeable to obtaining recommended adaptive equipment for ADl's.  Pt will have assist from spouse at home.  Pt attentive to education as stated below.  Pt appears appropriate for discharge home.    Treatment completed this session after initial evaluation completed:  Educated pt on role of OT and Total Knee Replacement Protocol.  Reviewed application of precautions and use of Adaptive Equipment for dressing, bathing, toileting, grooming, (as needed) kitchen activities and general functional mobility in the home.  Reviewed the use of reacher,  shower chair and raised toilet seat to assist with ADL's.  Reviewed stall shower transfers. Provided pt with suggestions on where to obtain needed items. Educated on mgmt of waterproof post op bandage with dressing and showering.  Pt verbalized understanding of all education provided.     Plan    Occupational Therapy Initial Treatment Plan   Duration: Evaluation only    DC Equipment Recommendations: None  Discharge Recommendations: Anticipate that the patient will have no further occupational therapy needs after discharge from the hospital     Subjective    \"I think I'll be able to manage this- I have to be ready for PT on Friday this week.\"     Objective       09/04/24 1021   Prior Living Situation "   Prior Services Home-Independent   Housing / Facility 1 Story House   Steps Into Home 2   Steps In Home 0   Bathroom Set up Walk In Shower;Shower Chair;Grab Bars   Equipment Owned Front-Wheel Walker;Single Point Cane;Crutches;Tub / Shower Seat;Grab Bar(s) In Tub / Shower;Reacher;Raised Toilet Seat Without Arms  (game ready ice machine)   Lives with - Patient's Self Care Capacity Spouse   Comments Spouse home and supportive.  Pt reports he has cohlear implant and hearing aide so when he takes them out at night he can't hear her if she needs help.  They planned ahead for her to stay in the same room as him in case she needs help in the middle of the night   Prior Level of ADL Function   Self Feeding Independent   Grooming / Hygiene Independent   Bathing Independent   Dressing Independent   Toileting Independent   Prior Level of IADL Function   Medication Management Independent   Laundry Requires Assist   Kitchen Mobility Requires Assist   Finances Independent   Home Management Requires Assist   Shopping Requires Assist   Prior Level Of Mobility Independent With Device in Home   Driving / Transportation Relatives / Others Provide Transportation   Occupation (Pre-Hospital Vocational) Retired Due To Age   Leisure Interests Gardening   Comments Pt reports the last several months pain has prevented her from being able to do a lot of her IADL's recently   History of Falls   History of Falls No   Precautions   Precautions Fall Risk;Weight Bearing As Tolerated Right Lower Extremity   Vitals   Patient BP Position Supine   Blood Pressure  (!) 191/82   O2 Delivery Device None - Room Air   Vitals Comments sitting EOB- 186/77   Pain 0 - 10 Group   Location Knee   Location Orientation Right   Description Aching;Constant   Therapist Pain Assessment During Activity;Nurse Notified  (not rated, states its getting more intense)   Cognition    Cognition / Consciousness WDL   Comments pleasant, anxious, slightly impulsive.  Needs cues  for safety with FWW   Active ROM Upper Body   Active ROM Upper Body  WDL   Strength Upper Body   Upper Body Strength  WDL   Coordination Upper Body   Coordination WDL   Balance Assessment   Sitting Balance (Static) Good   Sitting Balance (Dynamic) Fair +   Standing Balance (Static) Fair +   Standing Balance (Dynamic) Fair +   Weight Shift Sitting Good   Weight Shift Standing Fair   Comments with FWW   Bed Mobility    Supine to Sit Modified Independent   Comments UIC post   ADL Assessment   Eating Independent   Grooming Standby Assist;Standing   Upper Body Dressing Modified Independent  (clean gown)   Lower Body Dressing Minimal Assist  (doff and don underwear)   Toileting Standby Assist   How much help from another person does the patient currently need...   Putting on and taking off regular lower body clothing? 3   Bathing (including washing, rinsing, and drying)? 3   Toileting, which includes using a toilet, bedpan, or urinal? 3   Putting on and taking off regular upper body clothing? 4   Taking care of personal grooming such as brushing teeth? 3   Eating meals? 4   6 Clicks Daily Activity Score 20   Functional Mobility   Sit to Stand Standby Assist   Bed, Chair, Wheelchair Transfer Standby Assist   Toilet Transfers Standby Assist  (with elevated over toilet commode)   Transfer Method Stand Step   Mobility SBA with FWW, OOB to BR, to sink, to chair   Distance (Feet) 25   # of Times Distance was Traveled 2   Visual Perception   Visual Perception  WDL   Edema / Skin Assessment   Comments ace and waterproof dressing intact.   Activity Tolerance   Sitting in Chair > 20 min   Sitting Edge of Bed 5 min   Standing 3-4 min x2   Patient / Family Goals   Patient / Family Goal #1 home   Education Group   Education Provided Role of Occupational Therapist;Activities of Daily Living;Adaptive Equipment;Home Safety   Role of Occupational Therapist Patient Response Patient;Family;Acceptance;Explanation;Verbal Demonstration   Home  Safety Patient Response Patient;Family;Acceptance;Explanation;Demonstration;Verbal Demonstration   ADL Patient Response Patient;Family;Acceptance;Explanation;Demonstration;Handout;Verbal Demonstration   Adaptive Equipment Patient Response Patient;Family;Acceptance;Explanation;Demonstration;Handout;Verbal Demonstration   Additional Comments see notes

## 2024-09-04 NOTE — THERAPY
Physical Therapy   Initial Evaluation     Patient Name: Josie Vale  Age:  73 y.o., Sex:  female  Medical Record #: [id number]  Today's Date: 9/4/2024     Precautions  Precautions: Fall Risk;Weight Bearing As Tolerated Right Lower Extremity    Assessment  Patient is 73 y.o. female s/p right TKA POD #1.  Pt agreeable to therapy evaluation, admitted to [facility] for post-op Afib. Pt is able to ambulate safely with FWW, stair training completed. Pt was provided with education on elevation, icing, positioning, and supine/seated therapeutic exercises. HEP handout issued, pt able to return demo all exercises.  Pt has support at home. Will plan to continue to see pt until cleared by MD for DC, see POC below.     Plan    Physical Therapy Initial Treatment Plan   Treatment Plan : Bed Mobility, Gait Training, Therapeutic Activities, Therapeutic Exercise, Stair Training, Self Care / Home Evaluation, Neuro Re-Education / Balance, Family / Caregiver Training  Treatment Frequency: Daily  Duration: Until Therapy Goals Met    DC Equipment Recommendations: None  Discharge Recommendations: Recommend outpatient physical therapy services to address higher level deficits          09/04/24 1325   Prior Living Situation   Housing / Facility 1 Story House   Steps Into Home 2   Steps In Home 0   Equipment Owned Front-Wheel Walker;Single Point Cane;Crutches;Tub / Shower Seat;Grab Bar(s) In Tub / Shower;Reacher;Raised Toilet Seat Without Arms   Lives with - Patient's Self Care Capacity Spouse   Comments supportive spouse   Prior Level of Functional Mobility   Bed Mobility Independent   Transfer Status Independent   Ambulation Independent   Assistive Devices Used None   Stairs Independent   Cognition    Comments Pt sitting up in chair, agreeable for PT.   Passive ROM Lower Body   Passive ROM Lower Body X   Comments R knee 0-75 deg   Active ROM Lower Body    Active ROM Lower Body  X   Comments R knee 0-70 deg   Strength Lower Body   Lower Body  Strength  X   Comments R knee limited by pain   Sensation Lower Body   Lower Extremity Sensation   WDL   Balance Assessment   Sitting Balance (Static) Good   Sitting Balance (Dynamic) Fair +   Standing Balance (Static) Fair   Standing Balance (Dynamic) Fair   Weight Shift Sitting Good   Weight Shift Standing Fair   Comments stdg with FWW   Bed Mobility    Supine to Sit Supervised   Sit to Supine Supervised   Gait Analysis   Gait Level Of Assist Standby Assist   Assistive Device Front Wheel Walker   Distance (Feet) 100   # of Times Distance was Traveled 1   Deviation Decreased Heel Strike;Decreased Toe Off   # of Stairs Climbed 1   Level of Assist with Stairs Contact Guard Assist   Weight Bearing Status WBAT R LE   Functional Mobility   Sit to Stand Standby Assist   Bed, Chair, Wheelchair Transfer Standby Assist   Activity Tolerance   Standing 10 min   Short Term Goals    Short Term Goal # 1 Pt will be able to perform bed mobility and sup <> sit Erika in 6 visits.   Short Term Goal # 2 Pt will be able to perform sit <> stand and transfer Erika in 6 visits.   Short Term Goal # 3 Pt will be able to ambulate 150 ft with FWW Erika in 6 visits.   Short Term Goal # 4 Pt will be able to go up/down 2 steps SBA in 6 visits.

## 2024-09-04 NOTE — PROGRESS NOTES
At 0900: Patient has elevated BP of 191/82 and Heart Rate of 52. This RN rechecked BP that resulted at 167/78 Heart Rate 53. Patient denies chest pain, dizziness, or shortness of breath. MD Nick notified. New orders  in MAR for 20mg Hydralazine      1220: MD Nick notified of /62 after Hydralazine administration. No new orders at this time.

## 2024-09-05 ENCOUNTER — APPOINTMENT (OUTPATIENT)
Dept: RADIOLOGY | Facility: MEDICAL CENTER | Age: 74
DRG: 469 | End: 2024-09-05
Attending: INTERNAL MEDICINE
Payer: MEDICARE

## 2024-09-05 PROBLEM — I26.99 BILATERAL PULMONARY EMBOLISM (HCC): Status: ACTIVE | Noted: 2024-09-05

## 2024-09-05 LAB
BASOPHILS # BLD AUTO: 0.8 % (ref 0–1.8)
BASOPHILS # BLD: 0.09 K/UL (ref 0–0.12)
D DIMER PPP IA.FEU-MCNC: 1.75 UG/ML (FEU) (ref 0–0.5)
EOSINOPHIL # BLD AUTO: 0.25 K/UL (ref 0–0.51)
EOSINOPHIL NFR BLD: 2.3 % (ref 0–6.9)
ERYTHROCYTE [DISTWIDTH] IN BLOOD BY AUTOMATED COUNT: 45.6 FL (ref 35.9–50)
HCT VFR BLD AUTO: 39.1 % (ref 37–47)
HGB BLD-MCNC: 12.9 G/DL (ref 12–16)
IMM GRANULOCYTES # BLD AUTO: 0.05 K/UL (ref 0–0.11)
IMM GRANULOCYTES NFR BLD AUTO: 0.5 % (ref 0–0.9)
LYMPHOCYTES # BLD AUTO: 1.19 K/UL (ref 1–4.8)
LYMPHOCYTES NFR BLD: 11 % (ref 22–41)
MAGNESIUM SERPL-MCNC: 1.8 MG/DL (ref 1.5–2.5)
MCH RBC QN AUTO: 29.4 PG (ref 27–33)
MCHC RBC AUTO-ENTMCNC: 33 G/DL (ref 32.2–35.5)
MCV RBC AUTO: 89.1 FL (ref 81.4–97.8)
MONOCYTES # BLD AUTO: 0.85 K/UL (ref 0–0.85)
MONOCYTES NFR BLD AUTO: 7.9 % (ref 0–13.4)
NEUTROPHILS # BLD AUTO: 8.38 K/UL (ref 1.82–7.42)
NEUTROPHILS NFR BLD: 77.5 % (ref 44–72)
NRBC # BLD AUTO: 0 K/UL
NRBC BLD-RTO: 0 /100 WBC (ref 0–0.2)
PLATELET # BLD AUTO: 195 K/UL (ref 164–446)
PMV BLD AUTO: 10.2 FL (ref 9–12.9)
RBC # BLD AUTO: 4.39 M/UL (ref 4.2–5.4)
TROPONIN T SERPL-MCNC: 13 NG/L (ref 6–19)
WBC # BLD AUTO: 10.8 K/UL (ref 4.8–10.8)

## 2024-09-05 PROCEDURE — 700111 HCHG RX REV CODE 636 W/ 250 OVERRIDE (IP): Performed by: INTERNAL MEDICINE

## 2024-09-05 PROCEDURE — A9270 NON-COVERED ITEM OR SERVICE: HCPCS | Performed by: ORTHOPAEDIC SURGERY

## 2024-09-05 PROCEDURE — 36415 COLL VENOUS BLD VENIPUNCTURE: CPT

## 2024-09-05 PROCEDURE — 700105 HCHG RX REV CODE 258: Performed by: HOSPITALIST

## 2024-09-05 PROCEDURE — 770020 HCHG ROOM/CARE - TELE (206)

## 2024-09-05 PROCEDURE — 700111 HCHG RX REV CODE 636 W/ 250 OVERRIDE (IP): Performed by: HOSPITALIST

## 2024-09-05 PROCEDURE — 700117 HCHG RX CONTRAST REV CODE 255: Performed by: INTERNAL MEDICINE

## 2024-09-05 PROCEDURE — 85025 COMPLETE CBC W/AUTO DIFF WBC: CPT

## 2024-09-05 PROCEDURE — A9270 NON-COVERED ITEM OR SERVICE: HCPCS | Performed by: HOSPITALIST

## 2024-09-05 PROCEDURE — 99233 SBSQ HOSP IP/OBS HIGH 50: CPT | Performed by: INTERNAL MEDICINE

## 2024-09-05 PROCEDURE — 97110 THERAPEUTIC EXERCISES: CPT

## 2024-09-05 PROCEDURE — 700111 HCHG RX REV CODE 636 W/ 250 OVERRIDE (IP): Mod: JZ | Performed by: ORTHOPAEDIC SURGERY

## 2024-09-05 PROCEDURE — 700102 HCHG RX REV CODE 250 W/ 637 OVERRIDE(OP): Performed by: HOSPITALIST

## 2024-09-05 PROCEDURE — 84484 ASSAY OF TROPONIN QUANT: CPT

## 2024-09-05 PROCEDURE — 99024 POSTOP FOLLOW-UP VISIT: CPT | Performed by: ORTHOPAEDIC SURGERY

## 2024-09-05 PROCEDURE — A9270 NON-COVERED ITEM OR SERVICE: HCPCS | Performed by: INTERNAL MEDICINE

## 2024-09-05 PROCEDURE — 700105 HCHG RX REV CODE 258: Performed by: INTERNAL MEDICINE

## 2024-09-05 PROCEDURE — 83735 ASSAY OF MAGNESIUM: CPT

## 2024-09-05 PROCEDURE — 85379 FIBRIN DEGRADATION QUANT: CPT

## 2024-09-05 PROCEDURE — 700102 HCHG RX REV CODE 250 W/ 637 OVERRIDE(OP): Performed by: ORTHOPAEDIC SURGERY

## 2024-09-05 PROCEDURE — 71275 CT ANGIOGRAPHY CHEST: CPT

## 2024-09-05 PROCEDURE — 700102 HCHG RX REV CODE 250 W/ 637 OVERRIDE(OP): Performed by: INTERNAL MEDICINE

## 2024-09-05 PROCEDURE — 99407 BEHAV CHNG SMOKING > 10 MIN: CPT

## 2024-09-05 PROCEDURE — 94760 N-INVAS EAR/PLS OXIMETRY 1: CPT

## 2024-09-05 RX ORDER — DEXTROSE MONOHYDRATE 50 MG/ML
INJECTION, SOLUTION INTRAVENOUS CONTINUOUS
Status: DISCONTINUED | OUTPATIENT
Start: 2024-09-05 | End: 2024-09-07

## 2024-09-05 RX ORDER — SODIUM CHLORIDE 9 MG/ML
500 INJECTION, SOLUTION INTRAVENOUS ONCE
Status: COMPLETED | OUTPATIENT
Start: 2024-09-05 | End: 2024-09-05

## 2024-09-05 RX ORDER — METOPROLOL TARTRATE 25 MG/1
12.5 TABLET, FILM COATED ORAL 2 TIMES DAILY
Status: DISCONTINUED | OUTPATIENT
Start: 2024-09-05 | End: 2024-09-05

## 2024-09-05 RX ORDER — DILTIAZEM HYDROCHLORIDE 30 MG/1
30 TABLET, FILM COATED ORAL EVERY 6 HOURS
Status: DISCONTINUED | OUTPATIENT
Start: 2024-09-05 | End: 2024-09-07

## 2024-09-05 RX ORDER — NICOTINE 21 MG/24HR
1 PATCH, TRANSDERMAL 24 HOURS TRANSDERMAL ONCE
Status: COMPLETED | OUTPATIENT
Start: 2024-09-05 | End: 2024-09-06

## 2024-09-05 RX ADMIN — METOPROLOL TARTRATE 12.5 MG: 25 TABLET, FILM COATED ORAL at 08:16

## 2024-09-05 RX ADMIN — ACETAMINOPHEN 1000 MG: 500 TABLET ORAL at 23:24

## 2024-09-05 RX ADMIN — AMIODARONE HYDROCHLORIDE 150 MG: 1.5 INJECTION, SOLUTION INTRAVENOUS at 08:22

## 2024-09-05 RX ADMIN — OXYCODONE HYDROCHLORIDE 10 MG: 10 TABLET ORAL at 16:19

## 2024-09-05 RX ADMIN — ASPIRIN 81 MG: 81 TABLET, COATED ORAL at 04:56

## 2024-09-05 RX ADMIN — KETOROLAC TROMETHAMINE 15 MG: 15 INJECTION, SOLUTION INTRAMUSCULAR; INTRAVENOUS at 12:07

## 2024-09-05 RX ADMIN — DOCUSATE SODIUM 100 MG: 100 CAPSULE, LIQUID FILLED ORAL at 04:58

## 2024-09-05 RX ADMIN — KETOROLAC TROMETHAMINE 15 MG: 15 INJECTION, SOLUTION INTRAMUSCULAR; INTRAVENOUS at 04:57

## 2024-09-05 RX ADMIN — NICOTINE TRANSDERMAL SYSTEM 21 MG: 21 PATCH, EXTENDED RELEASE TRANSDERMAL at 13:06

## 2024-09-05 RX ADMIN — ACETAMINOPHEN 1000 MG: 500 TABLET ORAL at 12:06

## 2024-09-05 RX ADMIN — SODIUM CHLORIDE 500 ML: 9 INJECTION, SOLUTION INTRAVENOUS at 01:53

## 2024-09-05 RX ADMIN — DILTIAZEM HYDROCHLORIDE 10 MG/HR: 5 INJECTION INTRAVENOUS at 09:05

## 2024-09-05 RX ADMIN — ACETAMINOPHEN 1000 MG: 500 TABLET ORAL at 04:58

## 2024-09-05 RX ADMIN — IOHEXOL 80 ML: 350 INJECTION, SOLUTION INTRAVENOUS at 15:57

## 2024-09-05 RX ADMIN — APIXABAN 10 MG: 5 TABLET, FILM COATED ORAL at 17:26

## 2024-09-05 RX ADMIN — AMIODARONE HYDROCHLORIDE 0.5 MG/MIN: 1.8 INJECTION, SOLUTION INTRAVENOUS at 21:31

## 2024-09-05 RX ADMIN — SENNOSIDES AND DOCUSATE SODIUM 1 TABLET: 50; 8.6 TABLET ORAL at 20:29

## 2024-09-05 RX ADMIN — OXYCODONE HYDROCHLORIDE 10 MG: 10 TABLET ORAL at 21:32

## 2024-09-05 RX ADMIN — HYDROCHLOROTHIAZIDE 25 MG: 25 TABLET ORAL at 04:58

## 2024-09-05 RX ADMIN — NICOTINE TRANSDERMAL SYSTEM 21 MG: 21 PATCH, EXTENDED RELEASE TRANSDERMAL at 04:57

## 2024-09-05 RX ADMIN — AMIODARONE HYDROCHLORIDE 0.5 MG/MIN: 1.8 INJECTION, SOLUTION INTRAVENOUS at 09:32

## 2024-09-05 RX ADMIN — DOCUSATE SODIUM 100 MG: 100 CAPSULE, LIQUID FILLED ORAL at 17:25

## 2024-09-05 RX ADMIN — AMIODARONE HYDROCHLORIDE 1 MG/MIN: 1.8 INJECTION, SOLUTION INTRAVENOUS at 03:32

## 2024-09-05 RX ADMIN — ACETAMINOPHEN 1000 MG: 500 TABLET ORAL at 17:26

## 2024-09-05 RX ADMIN — AMIODARONE HYDROCHLORIDE 150 MG: 1.5 INJECTION, SOLUTION INTRAVENOUS at 02:46

## 2024-09-05 RX ADMIN — TRAZODONE HYDROCHLORIDE 50 MG: 50 TABLET ORAL at 20:29

## 2024-09-05 RX ADMIN — DEXTROSE MONOHYDRATE: 50 INJECTION, SOLUTION INTRAVENOUS at 03:31

## 2024-09-05 ASSESSMENT — ENCOUNTER SYMPTOMS
CHILLS: 0
HEADACHES: 0
ABDOMINAL PAIN: 0
NAUSEA: 0
VOMITING: 0
SHORTNESS OF BREATH: 0
PALPITATIONS: 0
DIZZINESS: 0
FEVER: 0

## 2024-09-05 ASSESSMENT — PAIN DESCRIPTION - PAIN TYPE
TYPE: SURGICAL PAIN
TYPE: SURGICAL PAIN
TYPE: ACUTE PAIN
TYPE: ACUTE PAIN
TYPE: SURGICAL PAIN
TYPE: ACUTE PAIN
TYPE: ACUTE PAIN

## 2024-09-05 ASSESSMENT — LIFESTYLE VARIABLES: PACK_YEARS: 54

## 2024-09-05 NOTE — PROGRESS NOTES
At 1052: Monitor tech notified this RN that patient converted to Sinus Bradycardia with a heart rate of 53. This RN arrived to patients bedside. Vitals taken, patient denies shortness of breath, chest pain or dizziness. MD Nick aware. Per MD, discontinue Diltiazem infusion.

## 2024-09-05 NOTE — CARE PLAN
The patient is Watcher - Medium risk of patient condition declining or worsening    Shift Goals  Clinical Goals: Pain managment, monitor hear rate  Patient Goals: Get better and go home and do PT  Family Goals: serenity    Progress made toward(s) clinical / shift goals:    Problem: Knowledge Deficit - Standard  Goal: Patient and family/care givers will demonstrate understanding of plan of care, disease process/condition, diagnostic tests and medications  Outcome: Progressing  Note: Reviewed POC; discussed and provided education on medications, monitoring heart rate, rhythm, and vitals, labs, PT involved for knee surgery, doing in bed ROM exercises, and pain management. Patient received multiple medications to maintain heart rate.      Problem: Pain - Post Surgery  Goal: Alleviation or reduction of pain post surgery  Outcome: Progressing  Note: Discussed and provided education on pharmacological and non-pharmacological pain management interventions. Patient has scheduled and prn pain medications in MAR.        Patient is not progressing towards the following goals:

## 2024-09-05 NOTE — PROGRESS NOTES
OVERNIGHT HOSPITALIST:    Paged by nursing Staff.  Patient is sustaining heart rate ranging from 110 to 140 bpm.  I added normal saline bolus trying to avoid beta-blockers/calcium channel blockers but she is persistently tachycardic touching up to 170 bpm therefore I added 150 mg of IV amiodarone.  Patient was bradycardic earlier in the day.

## 2024-09-05 NOTE — CARE PLAN
The patient is Stable - Low risk of patient condition declining or worsening    Shift Goals  Clinical Goals: Pain management, monitor HR, ROM with right knee  Patient Goals: Discharge, sleep  Family Goals: serenity    Progress made toward(s) clinical / shift goals:  Patient updated on POC throughout shift with questions and concerns addressed. Patients pain controlled with current regimen and non-pharmacological methods. Patient ambulating throughout night and wearing SCDs while in bed. Patient also performing passive ROM of right knee.    Problem: Knowledge Deficit - Standard  Goal: Patient and family/care givers will demonstrate understanding of plan of care, disease process/condition, diagnostic tests and medications  Outcome: Progressing     Problem: Pain - Standard  Goal: Alleviation of pain or a reduction in pain to the patient’s comfort goal  Outcome: Progressing     Problem: Venous Thromboembolism (VTE) Prevention  Goal: The patient will remain free from venous thromboembolism (VTE)  Outcome: Progressing     Patient is not progressing towards the following goals:

## 2024-09-05 NOTE — PROGRESS NOTES
Received report from Kayla BENDER.   Bed is locked and in lowest position. Fall and Safety precautions in place. Reviewed POC. Call light within reach. No other needs at this time.

## 2024-09-05 NOTE — THERAPY
Physical Therapy   Daily Treatment     Patient Name: Josie Vale  Age:  73 y.o., Sex:  female  Medical Record #: [id number]  Today's Date: 9/5/2024     Precautions  Precautions: Fall Risk;Weight Bearing As Tolerated Right Lower Extremity    Assessment    Standing and ambulation limited by heart concerns per RN.  ROM 0-90     Plan    Treatment Plan Status:    Type of Treatment: Bed Mobility, Gait Training, Therapeutic Activities, Therapeutic Exercise, Stair Training, Self Care / Home Evaluation, Neuro Re-Education / Balance, Family / Caregiver Training  Treatment Frequency: Daily  Treatment Duration: Until Therapy Goals Met    DC Equipment Recommendations: None  Discharge Recommendations: Recommend outpatient physical therapy services to address higher level deficits     Objective       09/05/24 0900   Charge Group   PT Therapeutic Exercise (Units) 2   Passive ROM Lower Body   Comments 0-90   Supine Lower Body Exercise   Supine Lower Body Exercises Yes   Sitting Lower Body Exercises   Sitting Lower Body Exercises Yes   Balance   Sitting Balance (Static) Good   Sitting Balance (Dynamic) Good   Bed Mobility    Supine to Sit Modified Independent   Sit to Supine Modified Independent   Scooting Supervised   Gait Analysis   Comments did not ambulate secondary to cardiac concerns   Activity Tolerance   Sitting Edge of Bed 10   Short Term Goals    Short Term Goal # 1 Pt will be able to perform bed mobility and sup <> sit Erika in 6 visits.   Goal Outcome # 1 Progressing as expected   Short Term Goal # 2 Pt will be able to perform sit <> stand and transfer Erika in 6 visits.   Goal Outcome # 2 Progressing as expected   Short Term Goal # 3 Pt will be able to ambulate 150 ft with FWW Erika in 6 visits.   Goal Outcome # 3 Goal not met   Short Term Goal # 4 Pt will be able to go up/down 2 steps SBA in 6 visits.   Goal Outcome # 4 Goal not met   Interdisciplinary Plan of Care Collaboration   IDT Collaboration with  Nursing    Session Information   Date / Session Number  9/5-2 2/7 9/10

## 2024-09-05 NOTE — PROGRESS NOTES
0127- Monitor tech called this RN to notify that the patient converted into A-Fib with RVR with her heart rate being 115 and touching up to 159. This RN went to assess the patient. Patient was asleep in bed with unlabored breathing with blood pressure at 129/86. When awoken up the patient had no complaints of discomfort or palpitaions.      0136- Jonathan Charge RN and Dr. Melton aware of above. Order received to administer 500ml bolus.     0153- 500ml bolus administered.     0215- Monitor tech called and informed this RN that patient's heart rate was sustaining above 140s and touched up to 170. Informed Dr. Melton and amiodarone bolus ordered.     0246- Amiodarone bolus administered.     0256- Amiodarone bolus complete. Patient's blood pressure was 116/67 with HR being 107-120 still in A-Fib. Patient awake sitting up in bed at this time. Patient still has no complaints of discomfort or palpitations. Informed Dr. Melton and orders received for amiodarone drip. Charge RN Jonathan aware.    0332- Amio drip started. Patient /87 in A-Fib rate 106. ICU charge YULIA Jackson aware of above events.

## 2024-09-05 NOTE — PROGRESS NOTES
0730: Monitor tech notified primary RN of patient sustaining 140's heart rate. This RN went to patient's bedside. Patient sitting at the edge of bed, eating breakfast, denies shortness of breath or chest pain. MD Nick notified. New orders to be in MAR.

## 2024-09-05 NOTE — PROGRESS NOTES
Charge Nurse Rounding Note    Bedside rounding completed to address quality of care and overall patient experience.    Patient Satisfaction addressed including staff responsiveness. Patient/family are aware of the POC and any questions answered. Thorough safety education completed including use of call light prior to all mobility throughout the entirety of the hospital stay.     Patient/family aware of time of next Hourly Round.    No further questions/concerns currently.     Additional Notes:          14-Sep-2020

## 2024-09-05 NOTE — OP REPORT
Subjective:    Back in afib RVR overnight.    Objective:  /76   Pulse (!) 114   Temp 36.3 °C (97.3 °F) (Temporal)   Resp 16   Ht 1.829 m (6')   Wt 106 kg (233 lb 0.4 oz)   SpO2 91%     Recent Labs     09/03/24 2302 09/04/24  0310 09/05/24  0127   WBC 17.1* 15.8* 10.8   RBC 4.53 4.45 4.39   HEMOGLOBIN 13.5 13.2 12.9   HEMATOCRIT 40.3 40.0 39.1   MCV 89.0 89.9 89.1   MCH 29.8 29.7 29.4   MCHC 33.5 33.0 33.0   RDW 45.0 45.8 45.6   PLATELETCT 210 188 195   MPV 9.8 10.2 10.2     Recent Labs     09/03/24 2302 09/04/24  0310   SODIUM 132* 134*   POTASSIUM 4.2 4.0   CHLORIDE 96 96   CO2 23 24   GLUCOSE 94 100*   BUN 16 15   CREATININE 0.70 0.69   CALCIUM 8.9 9.0          Intake/Output Summary (Last 24 hours) at 9/5/2024 0929  Last data filed at 9/5/2024 0900  Gross per 24 hour   Intake 1020 ml   Output --   Net 1020 ml       Comfortable, no distress  Neurologically and vascularly intact with palpable pedal pulses bilaterally.  Dressing C/D/I      Assessment:  s/p total knee arthroplasty.  Afib RVR    Plan:    Appreciate medical management  PT/OT  Encourage knee ROM  Ice machine to the knee  Could start Elaquis or Xarelto tomorrow if needed (no bolus dosing please).

## 2024-09-05 NOTE — PROGRESS NOTES
Telemetry Shift Summary     Rhythm SB converted to Afib rvr  HR Range  up to 192  Ectopy roPVC/oPAC  Measurements  0.14/0.08/0.46       Normal Values  Rhythm SR  HR Range    Measurements 0.12-0.20 / 0.06-0.10  / 0.30-0.52

## 2024-09-05 NOTE — RESPIRATORY CARE
"   EDUCATION by COPD CLINICAL EDUCATOR  9/5/2024 at 2:22 PM by Randi Newell, RRT     Patient interviewed by education team. Patient does not have a history or diagnosis of COPD. Patient is a smoker.  Therefore, smoking cessation education done. Smoking Cessation Intervention and education completed, 15 minutes spent on smoking cessation education with patient.  Provided smoking cessation packet with \"Tips to Quit\" and brochure for \"Free Smoking Cessation Classes\".     COPD Screen  COPD Risk Screening  Do you have a history of COPD?: No (no hx dx COPD, currently smoking)    COPD Assessment  COPD Clinical Specialists ONLY  COPD Education Initiated: Yes--Short Intervention (No hx dx COPD, currentl smoker, post- op Afib, Smoking Cessation education provided)  Is this a COPD exacerbation patient?: No  [employer]: [facility]  Physician Name: None  Pulmonologist Name: None  Referrals Initiated: Yes  Pulmonary Rehab: Declined  Smoking Cessation: Yes  $ Smoking Cessation >10 Minutes: Symptomatic  Smoking Pack Years: 54  Hospice: N/A  Home Health Care: Yes  Mobile Urgent Care Services: N/A (info given)  [facility]: N/A  Private In-Home Care Agency: N/A  (OP) Pulmonary Function Testing: Yes (encourage to determine Lung Function)  Interdisciplinary Rounds: Attendance at Rounds (30 Min)    PFT Results    No results found for: \"PFT\"    Meds to Beds  [facility] provides bedside medication delivery for all eligible patients at discharge and you have been automatically enrolled in the Meds to Beds Program. Would you like to opt out of this program for any reason?: No - Stay Opted In     MY COPD ACTION PLAN     It is recommended that patients and physicians /healthcare providers complete this action plan together. This plan should be discussed at each physician visit and updated as needed.    The green, yellow and red zones show groups of symptoms of COPD. This list of symptoms is not comprehensive, and you may experience " "other symptoms. In the \"Actions\" column, your healthcare provider has recommended actions for you to take based on your symptoms.    Patient Name: Josie Vale   YOB: 1950   Last Updated on:     Green Zone:  I am doing well today Actions     Usual activitiy and exercise level   Take daily medications     Usual amounts of cough and phlegm/mucus   Use oxygen as prescribed     Sleep well at night   Continue regular exercise/diet plan     Appetite is good   At all times avoid cigarette smoke, inhaled irritants     Daily Medications (these medications are taken every day):                Yellow Zone:  I am having a bad day or a COPD flare Actions     More breathless than usual   Continue daily medications     I have less energy for my daily activities   Use quick relief inhaler as ordered     Increased or thicker phlegm/mucus   Use oxygen as prescribed     Using quick relief inhaler/nebulizer more often   Get plenty of rest     Swelling of ankles more than usual   Use pursed lip breathing     More coughing than usual   At all times avoid cigarette smoke, inhaled irritants     I feel like I have a \"chest cold\"     Poor sleep and my symptoms woke me up     My appetite is not good     My medicine is not helping      Call provider immediately if symptoms don’t improve     Continue daily medications, add rescue medications:               Medications to be used during a flare up, (as Discussed with Provider):              Red Zone:  I need urgent medical care Actions     Severe shortness of breath even at rest   Call 911 or seek medical care immediately     Not able to do any activity because of breathing      Fever or shaking chills      Feeling confused or very drowsy       Chest pains      Coughing up blood                  "

## 2024-09-05 NOTE — PROGRESS NOTES
Hospital Medicine Daily Progress Note    Date of Service  9/5/2024    Chief Complaint  Josie Vale is a 73 y.o. female admitted 9/3/2024 with initially A-fib and RVR postop right TKA, after being treated she developed significant persistent bradycardia.  She was also noted to have leukocytosis and hyponatremia patient did not sleep well last night blood pressure is elevated today heart rate in the low 50s    Hospital Course  Patient admitted to telemetry due to bradycardia following RVR.  She remained bradycardic overnight, beta-blockers were subsequently discontinued, she was seen by cardiology who have since signed off, echocardiogram overall unremarkable.      Interval Problem Update  Patient went back into RVR overnight and was given amiodarone bolus around 2 AM, remained with heart rate 120s to 140s this morning and was given another bolus and started on diltiazem drip however around this time she had also been resumed on Lopressor although very low-dose at 12.5.  Shortly thereafter she became bradycardic down to the 40s again.  I discussed with cardiology about my concern that she is very sensitive to beta-blockers, I do not think she had enough diltiazem to have impacted her heart rate in any significant way and clearly the amiodarone is not adequate control.    He recommended starting oral diltiazem, and starting anticoagulation since the A-fib seem to be an ongoing issue placing her at risk of stroke.  Orthopedics note said this could be started tomorrow but I am checking with them to see if we can perhaps start it this evening.  In addition a D-dimer was obtained which was elevated.  Checking a CTA.      I have discussed this patient's plan of care and discharge plan at IDT rounds today with Case Management, Nursing, Nursing leadership, and other members of the IDT team.    Consultants/Specialty  cardiology and orthopedics    Code Status  Full Code    Disposition  The patient is not medically  cleared for discharge to home or a post-acute facility.  Anticipate discharge to: home with close outpatient follow-up    I have placed the appropriate orders for post-discharge needs.    Review of Systems  Review of Systems   Constitutional:  Negative for chills and fever.   Respiratory:  Negative for shortness of breath.    Cardiovascular:  Negative for chest pain and palpitations.        No pleuritic chest pain   Gastrointestinal:  Negative for abdominal pain, nausea and vomiting.   Musculoskeletal:         Right leg pain-well controlled   Neurological:  Negative for dizziness and headaches.        Physical Exam  Temp:  [36.1 °C (97 °F)-36.6 °C (97.8 °F)] 36.6 °C (97.8 °F)  Pulse:  [] 62  Resp:  [16-18] 18  BP: (116-168)/() 143/108  SpO2:  [91 %-95 %] 95 %    Physical Exam  Vitals and nursing note reviewed.   Constitutional:       General: She is not in acute distress.     Appearance: She is obese. She is not ill-appearing.   HENT:      Head: Normocephalic and atraumatic.      Nose: Nose normal.      Mouth/Throat:      Mouth: Mucous membranes are moist.   Eyes:      Extraocular Movements: Extraocular movements intact.      Pupils: Pupils are equal, round, and reactive to light.   Cardiovascular:      Rate and Rhythm: Bradycardia present. Rhythm irregular.   Pulmonary:      Effort: Pulmonary effort is normal.      Breath sounds: Normal breath sounds.   Abdominal:      General: Bowel sounds are normal. There is no distension.      Palpations: Abdomen is soft.      Tenderness: There is no abdominal tenderness.   Musculoskeletal:      Right lower leg: No edema.      Left lower leg: No edema.      Comments: Right knee postoperative dressing, looks pretty good knee does not seem to be swollen underneath the dressing.  She looks pretty comfortable   Skin:     General: Skin is warm and dry.   Neurological:      General: No focal deficit present.      Mental Status: She is alert and oriented to person, place,  and time.      Cranial Nerves: No cranial nerve deficit.   Psychiatric:         Mood and Affect: Mood normal.         Behavior: Behavior normal.       Fluids    Intake/Output Summary (Last 24 hours) at 9/5/2024 1541  Last data filed at 9/5/2024 1115  Gross per 24 hour   Intake 1020 ml   Output 500 ml   Net 520 ml        Laboratory  Recent Labs     09/03/24 2302 09/04/24  0310 09/05/24  0127   WBC 17.1* 15.8* 10.8   RBC 4.53 4.45 4.39   HEMOGLOBIN 13.5 13.2 12.9   HEMATOCRIT 40.3 40.0 39.1   MCV 89.0 89.9 89.1   MCH 29.8 29.7 29.4   MCHC 33.5 33.0 33.0   RDW 45.0 45.8 45.6   PLATELETCT 210 188 195   MPV 9.8 10.2 10.2     Recent Labs     09/03/24 2302 09/04/24  0310   SODIUM 132* 134*   POTASSIUM 4.2 4.0   CHLORIDE 96 96   CO2 23 24   GLUCOSE 94 100*   BUN 16 15   CREATININE 0.70 0.69   CALCIUM 8.9 9.0                   Imaging  EC-ECHOCARDIOGRAM COMPLETE W/O CONT   Final Result      CT-CTA CHEST PULMONARY ARTERY W/ RECONS    (Results Pending)        Assessment/Plan  * Bradycardia  Assessment & Plan  Recurrent, suspected to small dose of Lopressor which was resumed this morning  Discussed with cardiology who is agreeable with stopping Lopressor but recommends starting oral diltiazem.  This has been ordered with parameters    Bilateral pulmonary embolism (HCC)- (present on admission)  Assessment & Plan  Discussed with orthopedics, they are reluctantly agreeable with loading Eliquis given the circumstances, will continue to monitor for complications  I did discuss the option of heparin drip in the short-term however Dr. Byrd felt that this was more associated with complications    Other insomnia  Assessment & Plan  Patient's blood pressure is up today and she states that she is not feeling well due to not sleeping well preoperative and not sleeping at all last night due to frequent interruptions and noise.  She is agreeable with trying something tonight for sleep, will start her on trazodone    Atrial fibrillation  with RVR (HCC)- (present on admission)  Assessment & Plan  Recurrent this morning, did not improve after amiodarone bolus around 2 AM, given another bolus around 9 AM but also received 12.5 mg of Lopressor and was started on diltiazem drip however just after initiating the diltiazem patient became bradycardic to the 40s.    Given her recent surgery a D-dimer was checked which was elevated therefore I am proceeding with CTA    Cardiology recommends starting oral diltiazem-I have written this with parameters  Also recommends initiating anticoagulation if OK with ortho    Encounter for smoking cessation counseling  Assessment & Plan  -Patient is a smoking for more than 50 years.  She states that she will maybe consider quit smoking at some point and I offered to give her nicotine patch which she accepted.  We talked about a few things she can try outpatient when she is ready to quit smoking.  Total amount of time allocated on tobacco smoking cessation was 4 minutes.    Elevated brain natriuretic peptide (BNP) level  Assessment & Plan  proBNP 1089.  This could be elevated secondary to recent A-fib with RVR.  Does not appear to be volume overloaded has clear lungs    Leukocytosis  Assessment & Plan  WBC 17.1, trending down 15.8, no fever, recheck tomorrow    Hyponatremia  Assessment & Plan  -Laboratory showing sodium of 132.  134 today    Total knee replacement status, right- (present on admission)  Assessment & Plan  -Done 9/3/2024  -Plan per orthopedic team.  Regional block wearing off, patient trying to hold off on using opioids but understands she may need to in order to do her therapy         VTE prophylaxis: Loading with Eliquis due to bilateral PE    I have performed a physical exam and reviewed and updated ROS and Plan today (9/5/2024). In review of yesterday's note (9/4/2024), there are no changes except as documented above.

## 2024-09-05 NOTE — PROGRESS NOTES
Telemetry Shift Summary    Rhythm Afib/SB/SR  HR Range   Ectopy : roPVC, oPAC  Measurements 0.18/0.10/0.46    Normal Values  Rhythm SR  HR Range:   Measurements: 0.12-0.20/0.06-0.10/0.30-0.52

## 2024-09-06 LAB
ALBUMIN SERPL BCP-MCNC: 3.2 G/DL (ref 3.2–4.9)
ALBUMIN/GLOB SERPL: 1.3 G/DL
ALP SERPL-CCNC: 66 U/L (ref 30–99)
ALT SERPL-CCNC: 16 U/L (ref 2–50)
ANION GAP SERPL CALC-SCNC: 15 MMOL/L (ref 7–16)
AST SERPL-CCNC: 28 U/L (ref 12–45)
BILIRUB SERPL-MCNC: 0.5 MG/DL (ref 0.1–1.5)
BUN SERPL-MCNC: 13 MG/DL (ref 8–22)
CALCIUM ALBUM COR SERPL-MCNC: 9.1 MG/DL (ref 8.5–10.5)
CALCIUM SERPL-MCNC: 8.5 MG/DL (ref 8.4–10.2)
CHLORIDE SERPL-SCNC: 94 MMOL/L (ref 96–112)
CO2 SERPL-SCNC: 22 MMOL/L (ref 20–33)
CREAT SERPL-MCNC: 0.64 MG/DL (ref 0.5–1.4)
ERYTHROCYTE [DISTWIDTH] IN BLOOD BY AUTOMATED COUNT: 45.1 FL (ref 35.9–50)
GFR SERPLBLD CREATININE-BSD FMLA CKD-EPI: 93 ML/MIN/1.73 M 2
GLOBULIN SER CALC-MCNC: 2.4 G/DL (ref 1.9–3.5)
GLUCOSE SERPL-MCNC: 108 MG/DL (ref 65–99)
HCT VFR BLD AUTO: 36.8 % (ref 37–47)
HGB BLD-MCNC: 12.3 G/DL (ref 12–16)
MCH RBC QN AUTO: 29.4 PG (ref 27–33)
MCHC RBC AUTO-ENTMCNC: 33.4 G/DL (ref 32.2–35.5)
MCV RBC AUTO: 88 FL (ref 81.4–97.8)
PLATELET # BLD AUTO: 197 K/UL (ref 164–446)
PMV BLD AUTO: 10.2 FL (ref 9–12.9)
POTASSIUM SERPL-SCNC: 3.3 MMOL/L (ref 3.6–5.5)
PROT SERPL-MCNC: 5.6 G/DL (ref 6–8.2)
RBC # BLD AUTO: 4.18 M/UL (ref 4.2–5.4)
SODIUM SERPL-SCNC: 131 MMOL/L (ref 135–145)
WBC # BLD AUTO: 10.9 K/UL (ref 4.8–10.8)

## 2024-09-06 PROCEDURE — A9270 NON-COVERED ITEM OR SERVICE: HCPCS | Performed by: INTERNAL MEDICINE

## 2024-09-06 PROCEDURE — 97110 THERAPEUTIC EXERCISES: CPT

## 2024-09-06 PROCEDURE — 99233 SBSQ HOSP IP/OBS HIGH 50: CPT | Performed by: INTERNAL MEDICINE

## 2024-09-06 PROCEDURE — 700102 HCHG RX REV CODE 250 W/ 637 OVERRIDE(OP): Performed by: INTERNAL MEDICINE

## 2024-09-06 PROCEDURE — 700102 HCHG RX REV CODE 250 W/ 637 OVERRIDE(OP): Performed by: ORTHOPAEDIC SURGERY

## 2024-09-06 PROCEDURE — 700102 HCHG RX REV CODE 250 W/ 637 OVERRIDE(OP): Performed by: HOSPITALIST

## 2024-09-06 PROCEDURE — 770020 HCHG ROOM/CARE - TELE (206)

## 2024-09-06 PROCEDURE — A9270 NON-COVERED ITEM OR SERVICE: HCPCS | Performed by: HOSPITALIST

## 2024-09-06 PROCEDURE — 80053 COMPREHEN METABOLIC PANEL: CPT

## 2024-09-06 PROCEDURE — A9270 NON-COVERED ITEM OR SERVICE: HCPCS | Performed by: ORTHOPAEDIC SURGERY

## 2024-09-06 PROCEDURE — 99024 POSTOP FOLLOW-UP VISIT: CPT | Performed by: ORTHOPAEDIC SURGERY

## 2024-09-06 PROCEDURE — 85027 COMPLETE CBC AUTOMATED: CPT

## 2024-09-06 PROCEDURE — 94760 N-INVAS EAR/PLS OXIMETRY 1: CPT

## 2024-09-06 PROCEDURE — 36415 COLL VENOUS BLD VENIPUNCTURE: CPT

## 2024-09-06 PROCEDURE — 97116 GAIT TRAINING THERAPY: CPT

## 2024-09-06 RX ORDER — POTASSIUM CHLORIDE 1500 MG/1
40 TABLET, EXTENDED RELEASE ORAL EVERY 4 HOURS
Status: COMPLETED | OUTPATIENT
Start: 2024-09-06 | End: 2024-09-06

## 2024-09-06 RX ORDER — AMIODARONE HYDROCHLORIDE 200 MG/1
200 TABLET ORAL
Status: DISCONTINUED | OUTPATIENT
Start: 2024-09-07 | End: 2024-09-07

## 2024-09-06 RX ADMIN — OXYCODONE HYDROCHLORIDE 5 MG: 5 TABLET ORAL at 17:04

## 2024-09-06 RX ADMIN — OXYCODONE HYDROCHLORIDE 10 MG: 10 TABLET ORAL at 23:19

## 2024-09-06 RX ADMIN — OXYCODONE HYDROCHLORIDE 5 MG: 5 TABLET ORAL at 12:04

## 2024-09-06 RX ADMIN — NICOTINE TRANSDERMAL SYSTEM 21 MG: 21 PATCH, EXTENDED RELEASE TRANSDERMAL at 04:37

## 2024-09-06 RX ADMIN — DILTIAZEM HYDROCHLORIDE 30 MG: 30 TABLET, FILM COATED ORAL at 23:18

## 2024-09-06 RX ADMIN — DOCUSATE SODIUM 100 MG: 100 CAPSULE, LIQUID FILLED ORAL at 17:04

## 2024-09-06 RX ADMIN — TRAZODONE HYDROCHLORIDE 50 MG: 50 TABLET ORAL at 20:07

## 2024-09-06 RX ADMIN — ACETAMINOPHEN 1000 MG: 500 TABLET ORAL at 17:04

## 2024-09-06 RX ADMIN — APIXABAN 10 MG: 5 TABLET, FILM COATED ORAL at 04:37

## 2024-09-06 RX ADMIN — ACETAMINOPHEN 1000 MG: 500 TABLET ORAL at 12:05

## 2024-09-06 RX ADMIN — DILTIAZEM HYDROCHLORIDE 30 MG: 30 TABLET, FILM COATED ORAL at 17:04

## 2024-09-06 RX ADMIN — APIXABAN 10 MG: 5 TABLET, FILM COATED ORAL at 17:04

## 2024-09-06 RX ADMIN — ACETAMINOPHEN 1000 MG: 500 TABLET ORAL at 23:17

## 2024-09-06 RX ADMIN — SENNOSIDES AND DOCUSATE SODIUM 1 TABLET: 50; 8.6 TABLET ORAL at 20:07

## 2024-09-06 RX ADMIN — DOCUSATE SODIUM 100 MG: 100 CAPSULE, LIQUID FILLED ORAL at 04:37

## 2024-09-06 RX ADMIN — DILTIAZEM HYDROCHLORIDE 30 MG: 30 TABLET, FILM COATED ORAL at 12:05

## 2024-09-06 RX ADMIN — POTASSIUM CHLORIDE 40 MEQ: 1500 TABLET, EXTENDED RELEASE ORAL at 12:05

## 2024-09-06 RX ADMIN — ACETAMINOPHEN 1000 MG: 500 TABLET ORAL at 04:36

## 2024-09-06 RX ADMIN — HYDROCHLOROTHIAZIDE 25 MG: 25 TABLET ORAL at 04:36

## 2024-09-06 RX ADMIN — POTASSIUM CHLORIDE 40 MEQ: 1500 TABLET, EXTENDED RELEASE ORAL at 08:55

## 2024-09-06 ASSESSMENT — COGNITIVE AND FUNCTIONAL STATUS - GENERAL
MOBILITY SCORE: 24
SUGGESTED CMS G CODE MODIFIER MOBILITY: CH

## 2024-09-06 ASSESSMENT — PAIN DESCRIPTION - PAIN TYPE
TYPE: ACUTE PAIN
TYPE: ACUTE PAIN;SURGICAL PAIN
TYPE: ACUTE PAIN

## 2024-09-06 ASSESSMENT — ENCOUNTER SYMPTOMS
FEVER: 0
PALPITATIONS: 0
DIZZINESS: 0
HEADACHES: 0
VOMITING: 0
CHILLS: 0
SHORTNESS OF BREATH: 0
ABDOMINAL PAIN: 0
NAUSEA: 0

## 2024-09-06 ASSESSMENT — GAIT ASSESSMENTS
DISTANCE (FEET): 125
DEVIATION: STEP TO
ASSISTIVE DEVICE: FRONT WHEEL WALKER
GAIT LEVEL OF ASSIST: SUPERVISED

## 2024-09-06 NOTE — CARE PLAN
The patient is Stable - Low risk of patient condition declining or worsening    Shift Goals  Clinical Goals: monitor HR and rhythm, pain control, ambulation  Patient Goals: comfort, go home  Family Goals: serenity    Progress made toward(s) clinical / shift goals:    Problem: Pain - Standard  Goal: Alleviation of pain or a reduction in pain to the patient’s comfort goal  Outcome: Progressing  Note: Pt pain level will be monitored and controlled with prn medication.     Problem: Fall Risk  Goal: Patient will remain free from falls  Outcome: Progressing  Note: Fall precautions in place. Pt will remain free of falls.       Patient is not progressing towards the following goals:

## 2024-09-06 NOTE — CARE PLAN
The patient is Stable - Low risk of patient condition declining or worsening    Shift Goals  Clinical Goals: pain management, HR and rhythm  Patient Goals: rest and comfort  Family Goals: THERON    Progress made toward(s) clinical / shift goals:    Problem: Knowledge Deficit - Standard  Goal: Patient and family/care givers will demonstrate understanding of plan of care, disease process/condition, diagnostic tests and medications  Outcome: Progressing  Note: Pt updated on POC, agreeable. Monitoring HR and rhythm and managing pain. Pt on amiodarone gtt. All questions and concerns addressed at this time.      Problem: Pain - Standard  Goal: Alleviation of pain or a reduction in pain to the patient’s comfort goal  Outcome: Progressing  Note: Pt reported 8/10 right knee pain, medicated per MAR. Pt educated on use of call light for any additional pain management needs.      Problem: Fall Risk  Goal: Patient will remain free from falls  Outcome: Progressing  Note: Pt is a high fall risk, fall precautions in place. Pt educated on use of call light for any pain management needs.

## 2024-09-06 NOTE — THERAPY
Physical Therapy   Daily Treatment     Patient Name: Josie Vale  Age:  73 y.o., Sex:  female  Medical Record #: [id number]  Today's Date: 9/6/2024          Assessment    Pt is safe with bed mob,transfers  and ambulation.She understands HEP.No SOB with ambulation normal heart rate with amb    Plan    Treatment Plan Status:    Type of Treatment: Bed Mobility, Gait Training, Therapeutic Activities, Therapeutic Exercise, Stair Training, Self Care / Home Evaluation, Neuro Re-Education / Balance, Family / Caregiver Training  Treatment Frequency: Daily  Treatment Duration: Until Therapy Goals Met    DC Equipment Recommendations: None  Discharge Recommendations: Recommend outpatient physical therapy services to address higher level deficits       Objective     09/06/24 0900   Charge Group   PT Gait Training (Units) 1   PT Therapeutic Exercise (Units) 1   Balance   Sitting Balance (Static) Good   Sitting Balance (Dynamic) Good   Standing Balance (Static) Fair +   Standing Balance (Dynamic) Fair +   Weight Shift Sitting Good   Weight Shift Standing Good   Bed Mobility    Supine to Sit Modified Independent   Sit to Supine Modified Independent   Scooting Modified Independent   Gait Analysis   Gait Level Of Assist Supervised   Assistive Device Front Wheel Walker   Distance (Feet) 125   # of Times Distance was Traveled 1   Deviation Step To   Weight Bearing Status wbat   Functional Mobility   Sit to Stand Supervised   Bed, Chair, Wheelchair Transfer Supervised   Transfer Method Stand Step   6 Clicks Assessment - How much HELP from from another person do you currently need... (If the patient hasn't done an activity recently, how much help from another person do you think he/she would need if he/she tried?)   Turning from your back to your side while in a flat bed without using bedrails? 4   Moving from lying on your back to sitting on the side of a flat bed without using bedrails? 4   Moving to and from a bed to a chair  (including a wheelchair)? 4   Standing up from a chair using your arms (e.g., wheelchair, or bedside chair)? 4   Walking in hospital room? 4   Climbing 3-5 steps with a railing? 4   6 clicks Mobility Score 24   Activity Tolerance   Sitting Edge of Bed 10   Standing 10   Short Term Goals    Short Term Goal # 1 Pt will be able to perform bed mobility and sup <> sit Erika in 6 visits.   Goal Outcome # 1 Goal met   Short Term Goal # 2 Pt will be able to perform sit <> stand and transfer Erika in 6 visits.   Goal Outcome # 2 Goal met   Short Term Goal # 3 Pt will be able to ambulate 150 ft with FWW Erika in 6 visits.   Goal Outcome # 3 Progressing as expected   Short Term Goal # 4 Pt will be able to go up/down 2 steps SBA in 6 visits.   Goal Outcome # 4 Goal met   Session Information   Date / Session Number  9/6-3 3/7 9/10

## 2024-09-06 NOTE — PROGRESS NOTES
Monitor tech notified RN that patient HR was sustaining Sinus Bradycardia low 40s, latest HR 45. /44. Dr. Melton notified. Amiodarone gtt discontinued.

## 2024-09-06 NOTE — PROGRESS NOTES
Telemetry Shift Summary    Rhythm SB/SR  HR Range 42-64  Ectopy rPVC/PAC  Measurements .18/.10/.40    Normal Values  Rhythm SR  HR Range:   Measurements: 0.12-0.20/0.06-0.10/0.30-0.52

## 2024-09-06 NOTE — PROGRESS NOTES
Subjective:    Found to have bilateral PEs yesterday, started on bolus dose Eliquis. Pain reasonably controlled.       Objective:  /44   Pulse (!) 50   Temp 36.4 °C (97.5 °F) (Temporal)   Resp 16   Ht 1.829 m (6')   Wt 106 kg (233 lb 0.4 oz)   SpO2 95%     Recent Labs     09/04/24  0310 09/05/24  0127 09/06/24  0136   WBC 15.8* 10.8 10.9*   RBC 4.45 4.39 4.18*   HEMOGLOBIN 13.2 12.9 12.3   HEMATOCRIT 40.0 39.1 36.8*   MCV 89.9 89.1 88.0   MCH 29.7 29.4 29.4   MCHC 33.0 33.0 33.4   RDW 45.8 45.6 45.1   PLATELETCT 188 195 197   MPV 10.2 10.2 10.2     Recent Labs     09/03/24  2302 09/04/24  0310 09/06/24  0136   SODIUM 132* 134* 131*   POTASSIUM 4.2 4.0 3.3*   CHLORIDE 96 96 94*   CO2 23 24 22   GLUCOSE 94 100* 108*   BUN 16 15 13   CREATININE 0.70 0.69 0.64   CALCIUM 8.9 9.0 8.5         Intake/Output Summary (Last 24 hours) at 9/6/2024 0615  Last data filed at 9/5/2024 1115  Gross per 24 hour   Intake 120 ml   Output 500 ml   Net -380 ml       Comfortable, no distress  Neurologically and vascularly intact with palpable pedal pulses bilaterally.  Dressing C/D/I with relatively normal bruising around the knee  She has been laying with the knee half-bent and not working on ROM.      Assessment:  s/p total knee arthroplasty.  Afib RVR  Bilateral PE    Plan:    WBAT  PT/OT  ACE wrap and Ice machine to the knee  She was reminded to work on full extension and flexion, which needs to continue   Discussed the risk of hematoma/wound issues/infection/bleeding that comes with high dose anticoagulants.

## 2024-09-06 NOTE — PROGRESS NOTES
Telemetry summary    Rhythm: SB/SR  Rate: 45-56  Ectopy: rPVC, r-oPAC, rCoup  Measurements: 0.18/0.08/0.50    Normal Values  Rhythm: SR  HR Range:   Measurement: 0.12-0.2/0.06-0.10/0.30-0.52

## 2024-09-07 PROBLEM — E87.6 HYPOKALEMIA: Status: ACTIVE | Noted: 2024-09-07

## 2024-09-07 LAB
EKG IMPRESSION: NORMAL
ERYTHROCYTE [DISTWIDTH] IN BLOOD BY AUTOMATED COUNT: 44.8 FL (ref 35.9–50)
HCT VFR BLD AUTO: 39.6 % (ref 37–47)
HGB BLD-MCNC: 13.4 G/DL (ref 12–16)
MCH RBC QN AUTO: 29.5 PG (ref 27–33)
MCHC RBC AUTO-ENTMCNC: 33.8 G/DL (ref 32.2–35.5)
MCV RBC AUTO: 87.2 FL (ref 81.4–97.8)
PLATELET # BLD AUTO: 231 K/UL (ref 164–446)
PMV BLD AUTO: 10.1 FL (ref 9–12.9)
RBC # BLD AUTO: 4.54 M/UL (ref 4.2–5.4)
WBC # BLD AUTO: 10 K/UL (ref 4.8–10.8)

## 2024-09-07 PROCEDURE — 700102 HCHG RX REV CODE 250 W/ 637 OVERRIDE(OP): Performed by: INTERNAL MEDICINE

## 2024-09-07 PROCEDURE — 85027 COMPLETE CBC AUTOMATED: CPT

## 2024-09-07 PROCEDURE — 94760 N-INVAS EAR/PLS OXIMETRY 1: CPT

## 2024-09-07 PROCEDURE — 93005 ELECTROCARDIOGRAM TRACING: CPT | Performed by: INTERNAL MEDICINE

## 2024-09-07 PROCEDURE — 97116 GAIT TRAINING THERAPY: CPT

## 2024-09-07 PROCEDURE — 700102 HCHG RX REV CODE 250 W/ 637 OVERRIDE(OP): Performed by: HOSPITALIST

## 2024-09-07 PROCEDURE — 99233 SBSQ HOSP IP/OBS HIGH 50: CPT | Performed by: INTERNAL MEDICINE

## 2024-09-07 PROCEDURE — 700102 HCHG RX REV CODE 250 W/ 637 OVERRIDE(OP): Performed by: ORTHOPAEDIC SURGERY

## 2024-09-07 PROCEDURE — 97110 THERAPEUTIC EXERCISES: CPT

## 2024-09-07 PROCEDURE — 770020 HCHG ROOM/CARE - TELE (206)

## 2024-09-07 PROCEDURE — A9270 NON-COVERED ITEM OR SERVICE: HCPCS | Performed by: ORTHOPAEDIC SURGERY

## 2024-09-07 PROCEDURE — 99024 POSTOP FOLLOW-UP VISIT: CPT | Performed by: STUDENT IN AN ORGANIZED HEALTH CARE EDUCATION/TRAINING PROGRAM

## 2024-09-07 PROCEDURE — 36415 COLL VENOUS BLD VENIPUNCTURE: CPT

## 2024-09-07 PROCEDURE — A9270 NON-COVERED ITEM OR SERVICE: HCPCS | Performed by: HOSPITALIST

## 2024-09-07 PROCEDURE — A9270 NON-COVERED ITEM OR SERVICE: HCPCS | Performed by: INTERNAL MEDICINE

## 2024-09-07 PROCEDURE — 93010 ELECTROCARDIOGRAM REPORT: CPT | Performed by: INTERNAL MEDICINE

## 2024-09-07 PROCEDURE — 700111 HCHG RX REV CODE 636 W/ 250 OVERRIDE (IP): Mod: JZ | Performed by: HOSPITALIST

## 2024-09-07 RX ORDER — DILTIAZEM HYDROCHLORIDE 30 MG/1
30 TABLET, FILM COATED ORAL EVERY 6 HOURS
Status: DISCONTINUED | OUTPATIENT
Start: 2024-09-07 | End: 2024-09-07

## 2024-09-07 RX ORDER — DILTIAZEM HYDROCHLORIDE 30 MG/1
60 TABLET, FILM COATED ORAL EVERY 6 HOURS
Status: DISCONTINUED | OUTPATIENT
Start: 2024-09-07 | End: 2024-09-08 | Stop reason: HOSPADM

## 2024-09-07 RX ORDER — AMIODARONE HYDROCHLORIDE 200 MG/1
200 TABLET ORAL TWICE DAILY
Status: DISCONTINUED | OUTPATIENT
Start: 2024-09-07 | End: 2024-09-08 | Stop reason: HOSPADM

## 2024-09-07 RX ORDER — POTASSIUM CHLORIDE 1500 MG/1
40 TABLET, EXTENDED RELEASE ORAL ONCE
Status: COMPLETED | OUTPATIENT
Start: 2024-09-07 | End: 2024-09-07

## 2024-09-07 RX ORDER — AMIODARONE HYDROCHLORIDE 200 MG/1
200 TABLET ORAL TWICE DAILY
Status: DISCONTINUED | OUTPATIENT
Start: 2024-09-07 | End: 2024-09-07

## 2024-09-07 RX ORDER — AMIODARONE HYDROCHLORIDE 200 MG/1
200 TABLET ORAL
Status: DISCONTINUED | OUTPATIENT
Start: 2024-09-07 | End: 2024-09-07

## 2024-09-07 RX ORDER — DILTIAZEM HYDROCHLORIDE 5 MG/ML
10 INJECTION INTRAVENOUS ONCE
Status: COMPLETED | OUTPATIENT
Start: 2024-09-07 | End: 2024-09-07

## 2024-09-07 RX ORDER — DEXTROSE MONOHYDRATE 50 MG/ML
INJECTION, SOLUTION INTRAVENOUS CONTINUOUS
Status: DISCONTINUED | OUTPATIENT
Start: 2024-09-07 | End: 2024-09-08 | Stop reason: HOSPADM

## 2024-09-07 RX ADMIN — ACETAMINOPHEN 1000 MG: 500 TABLET ORAL at 23:49

## 2024-09-07 RX ADMIN — HYDROCHLOROTHIAZIDE 25 MG: 25 TABLET ORAL at 05:21

## 2024-09-07 RX ADMIN — OXYCODONE HYDROCHLORIDE 10 MG: 10 TABLET ORAL at 20:45

## 2024-09-07 RX ADMIN — ACETAMINOPHEN 1000 MG: 500 TABLET ORAL at 05:21

## 2024-09-07 RX ADMIN — NICOTINE TRANSDERMAL SYSTEM 21 MG: 21 PATCH, EXTENDED RELEASE TRANSDERMAL at 05:21

## 2024-09-07 RX ADMIN — ACETAMINOPHEN 1000 MG: 500 TABLET ORAL at 11:37

## 2024-09-07 RX ADMIN — DOCUSATE SODIUM 100 MG: 100 CAPSULE, LIQUID FILLED ORAL at 05:21

## 2024-09-07 RX ADMIN — DILTIAZEM HYDROCHLORIDE 10 MG: 5 INJECTION, SOLUTION INTRAVENOUS at 04:16

## 2024-09-07 RX ADMIN — DILTIAZEM HYDROCHLORIDE 60 MG: 30 TABLET, FILM COATED ORAL at 11:37

## 2024-09-07 RX ADMIN — AMIODARONE HYDROCHLORIDE 200 MG: 200 TABLET ORAL at 17:03

## 2024-09-07 RX ADMIN — AMIODARONE HYDROCHLORIDE 200 MG: 200 TABLET ORAL at 04:01

## 2024-09-07 RX ADMIN — OXYCODONE HYDROCHLORIDE 5 MG: 5 TABLET ORAL at 03:06

## 2024-09-07 RX ADMIN — APIXABAN 10 MG: 5 TABLET, FILM COATED ORAL at 17:03

## 2024-09-07 RX ADMIN — SENNOSIDES AND DOCUSATE SODIUM 1 TABLET: 50; 8.6 TABLET ORAL at 20:43

## 2024-09-07 RX ADMIN — POTASSIUM CHLORIDE 40 MEQ: 1500 TABLET, EXTENDED RELEASE ORAL at 08:25

## 2024-09-07 RX ADMIN — DILTIAZEM HYDROCHLORIDE 60 MG: 30 TABLET, FILM COATED ORAL at 17:03

## 2024-09-07 RX ADMIN — DOCUSATE SODIUM 100 MG: 100 CAPSULE, LIQUID FILLED ORAL at 17:03

## 2024-09-07 RX ADMIN — TRAZODONE HYDROCHLORIDE 50 MG: 50 TABLET ORAL at 20:43

## 2024-09-07 RX ADMIN — ACETAMINOPHEN 1000 MG: 500 TABLET ORAL at 17:03

## 2024-09-07 RX ADMIN — DILTIAZEM HYDROCHLORIDE 30 MG: 30 TABLET, FILM COATED ORAL at 04:01

## 2024-09-07 RX ADMIN — OXYCODONE HYDROCHLORIDE 5 MG: 5 TABLET ORAL at 11:37

## 2024-09-07 RX ADMIN — APIXABAN 10 MG: 5 TABLET, FILM COATED ORAL at 05:21

## 2024-09-07 ASSESSMENT — PAIN DESCRIPTION - PAIN TYPE
TYPE: ACUTE PAIN
TYPE: ACUTE PAIN;SURGICAL PAIN
TYPE: ACUTE PAIN
TYPE: ACUTE PAIN
TYPE: ACUTE PAIN;SURGICAL PAIN

## 2024-09-07 ASSESSMENT — ENCOUNTER SYMPTOMS
HEADACHES: 0
NAUSEA: 0
INSOMNIA: 1
ABDOMINAL PAIN: 0
PALPITATIONS: 1
FEVER: 0
VOMITING: 0
SHORTNESS OF BREATH: 0
DIZZINESS: 0
CHILLS: 0

## 2024-09-07 ASSESSMENT — GAIT ASSESSMENTS
DEVIATION: STEP TO
ASSISTIVE DEVICE: FRONT WHEEL WALKER
GAIT LEVEL OF ASSIST: SUPERVISED
DISTANCE (FEET): 150

## 2024-09-07 NOTE — PROGRESS NOTES
0314: Monitor tech notified Rn that pt had converted Afib. Rate 90s-100s. BP in the 150/85. Other vitals WDL. Patient asymptomatic and denies chest pain and dizziness.     0342: Monitor tech notified RN pt heart rate sustaining in 140s. Dr Melton notified and ordered PO diltiazem and PO amiodarone.     0404: Pt ambulating to bathroom. Monitor tech notified RN that pt heart rate was sustaining 180s. Dr. Melton updated and ordered IV diltiazem.     0640: Monitor tech reported to RN pt converted back to SR. Rate 63

## 2024-09-07 NOTE — PROGRESS NOTES
Telemetry Shift Summary    Rhythm SB/SR  HR Range 48-63  Ectopy rPVC/PAC  Measurements .20/.08/.38    Normal Values  Rhythm SR  HR Range:   Measurements: 0.12-0.20/0.06-0.10/0.30-0.52

## 2024-09-07 NOTE — PROGRESS NOTES
Telemetry Shift Summary     Rhythm: SB-SR  Rate: 49-66  Measurements: 0.18/0.08/0.46  Ectopy (reported by Monitor Tech): rPVC, rPAC     Converted into Afib at 0310. Rate of 127 and up to 153    Normal Values  Rhythm: Sinus  HR:   Measurements: 0.12-0.20/0.06-0.10/0.30-0.52

## 2024-09-07 NOTE — THERAPY
Physical Therapy   Daily Treatment     Patient Name: Josie Vale  Age:  73 y.o., Sex:  female  Medical Record #: [id number]  Today's Date: 9/7/2024          Assessment    R knee 0-90 Pt able to SLR and good quads  contraction.Pt is safe with bed mob,transfers and ambulation    Plan    Treatment Plan Status:    Type of Treatment: Bed Mobility, Gait Training, Therapeutic Activities, Therapeutic Exercise, Stair Training, Self Care / Home Evaluation, Neuro Re-Education / Balance, Family / Caregiver Training  Treatment Frequency: Daily  Treatment Duration: Until Therapy Goals Met    DC Equipment Recommendations: None  Discharge Recommendations: Recommend outpatient physical therapy services to address higher level deficits       Objective       09/07/24 1137   Charge Group   PT Gait Training (Units) 1   PT Therapeutic Exercise (Units) 1   Pain 0 - 10 Group   Pain Rating Scale (NPRS) 6   Balance   Sitting Balance (Static) Good   Sitting Balance (Dynamic) Good   Standing Balance (Static) Fair +   Standing Balance (Dynamic) Fair +   Weight Shift Sitting Good   Weight Shift Standing Good   Bed Mobility    Supine to Sit Modified Independent   Sit to Supine Modified Independent   Scooting Modified Independent   Gait Analysis   Gait Level Of Assist Supervised   Assistive Device Front Wheel Walker   Distance (Feet) 150   # of Times Distance was Traveled 1   Deviation Step To   Functional Mobility   Sit to Stand Supervised   Bed, Chair, Wheelchair Transfer Supervised   Activity Tolerance   Sitting Edge of Bed 10   Standing 10   Short Term Goals    Short Term Goal # 3 Pt will be able to ambulate 150 ft with FWW Erika in 6 visits.   Goal Outcome # 3 Progressing as expected   Interdisciplinary Plan of Care Collaboration   IDT Collaboration with  Nursing   Session Information   Date / Session Number  9/7-4 4/7 9/10

## 2024-09-07 NOTE — PROGRESS NOTES
Hospital Medicine Daily Progress Note    Date of Service  9/7/2024    Chief Complaint  Josie Vale is a 73 y.o. female admitted 9/3/2024 with initially A-fib and RVR postop right TKA, after being treated she developed significant persistent bradycardia.  She was also noted to have leukocytosis and hyponatremia patient did not sleep well last night blood pressure is elevated today heart rate in the low 50s    Hospital Course  Patient admitted to telemetry due to bradycardia following RVR.  She remained bradycardic overnight, beta-blockers were subsequently discontinued, she was seen by cardiology who  signed off, echocardiogram overall unremarkable.  Due to recurrent problems with RVR, D-dimer was evaluated found to be positive and she was subsequently found to have bilateral PE.  Heart rate continued to be problematic alternating between RVR and bradycardia.       Interval Problem Update  Overnight patient was tachycardic, 140s to 180s.  Patient given p.o. diltiazem and p.o. amiodarone.  This morning heart rate fairly normal however continued episodes of tachycardia and bradycardia are preventing her from being discharged.  Discussed with cardiology recommended resuming amnio drip however by then patient was bradycardic therefore we will continue with oral amiodarone, diltiazem has been increased however does have hold parameter below 55.    Discussed challenges with patient and family.    I have discussed this patient's plan of care and discharge plan at IDT rounds today with Case Management, Nursing, Nursing leadership, and other members of the IDT team.    Consultants/Specialty  cardiology and orthopedics    Code Status  Full Code    Disposition  Medically Cleared  I have placed the appropriate orders for post-discharge needs.    Review of Systems  Review of Systems   Constitutional:  Negative for chills and fever.   Respiratory:  Negative for shortness of breath.    Cardiovascular:  Positive for  palpitations (When her heart goes very fast she can feel a flutter). Negative for chest pain.   Gastrointestinal:  Negative for abdominal pain, nausea and vomiting.   Musculoskeletal:         Right leg pain-well controlled   Neurological:  Negative for dizziness and headaches.   Psychiatric/Behavioral:  The patient has insomnia (However was woken up during the night due to the heart rate issues).         Physical Exam  Temp:  [36.1 °C (96.9 °F)-36.9 °C (98.4 °F)] 36.1 °C (96.9 °F)  Pulse:  [] 59  Resp:  [16-18] 16  BP: (133-156)/(67-85) 133/83  SpO2:  [90 %-95 %] 95 %    Physical Exam  Vitals and nursing note reviewed.   Constitutional:       General: She is not in acute distress.     Appearance: She is obese. She is not ill-appearing.   HENT:      Head: Normocephalic and atraumatic.      Nose: Nose normal.      Mouth/Throat:      Mouth: Mucous membranes are moist.   Eyes:      Extraocular Movements: Extraocular movements intact.      Pupils: Pupils are equal, round, and reactive to light.   Cardiovascular:      Rate and Rhythm: Normal rate. Rhythm irregular.   Pulmonary:      Effort: Pulmonary effort is normal.      Breath sounds: Normal breath sounds.   Abdominal:      General: Bowel sounds are normal. There is no distension.      Palpations: Abdomen is soft.      Tenderness: There is no abdominal tenderness.   Musculoskeletal:      Right lower leg: No edema.      Left lower leg: No edema.      Comments: Postop dressing unchanged   Skin:     General: Skin is warm and dry.   Neurological:      General: No focal deficit present.      Mental Status: She is alert and oriented to person, place, and time.      Cranial Nerves: No cranial nerve deficit.   Psychiatric:         Mood and Affect: Mood normal.         Behavior: Behavior normal.         Fluids    Intake/Output Summary (Last 24 hours) at 9/7/2024 1352  Last data filed at 9/7/2024 0802  Gross per 24 hour   Intake 200 ml   Output --   Net 200 ml         Laboratory  Recent Labs     09/05/24  0127 09/06/24  0136 09/07/24  0253   WBC 10.8 10.9* 10.0   RBC 4.39 4.18* 4.54   HEMOGLOBIN 12.9 12.3 13.4   HEMATOCRIT 39.1 36.8* 39.6   MCV 89.1 88.0 87.2   MCH 29.4 29.4 29.5   MCHC 33.0 33.4 33.8   RDW 45.6 45.1 44.8   PLATELETCT 195 197 231   MPV 10.2 10.2 10.1     Recent Labs     09/06/24  0136   SODIUM 131*   POTASSIUM 3.3*   CHLORIDE 94*   CO2 22   GLUCOSE 108*   BUN 13   CREATININE 0.64   CALCIUM 8.5                   Imaging  CT-CTA CHEST PULMONARY ARTERY W/ RECONS   Final Result      1.  Bilateral pulmonary emboli.   2.  No CT evidence of right heart strain.   3.  Reflux of intravenous contrast in the hepatic veins suggestive of right heart dysfunction.   4.  Atherosclerosis with coronary artery disease.   5.  Reticular nodular right upper lobe opacities with coarse calcification likely on the basis of prior granulomatous infection.   6.  Nonspecific enlarged precarinal lymph node. Findings can be seen in the setting of infection, inflammation or neoplasm. Follow-up as clinically appropriate.      Findings were conveyed to Dr. TIFFANY VARGAS on 9/5/2024 4:06 PM via electronic messaging.            EC-ECHOCARDIOGRAM COMPLETE W/O CONT   Final Result           Assessment/Plan  * Bradycardia  Assessment & Plan  Patient tachycardic again overnight, amiodarone drip stopped yesterday due to bradycardia in the morning.  Increased diltiazem to 60 mg every 6 hours with hold below 55.  Discussed with cardiology recommended starting amiodarone drip however heart rate is now again between 50 and 40.  Therefore we will continue with oral amiodarone twice daily.  Unclear what to do given her extremes and heart rate.  This is the reason she is not able to leave the hospital, she intermittently reverts to normal sinus rhythm so cardioversion unlikely to make any impact    Hypokalemia  Assessment & Plan  Mild, replace orally    Bilateral pulmonary embolism (HCC)- (present on  admission)  Assessment & Plan  Tolerating Eliquis, no evidence of bleeding    Other insomnia  Assessment & Plan  Still not sleeping well  However they did wake her up when her heart rate went up overnight    Atrial fibrillation with RVR ([facility])- (present on admission)  Assessment & Plan  Rate remains quite labile, overnight 9/5-9/6 she became bradycardic, overnight 9/6-9/7 she was tachycardic.  Now she is somewhat bradycardia today  When she is tachycardic amiodarone boluses have not seem to impact significantly, every time she is started on beta-blocker she becomes bradycardic.      Encounter for smoking cessation counseling  Assessment & Plan  -Patient is a smoking for more than 50 years.  She states that she will maybe consider quit smoking at some point and I offered to give her nicotine patch which she accepted.  We talked about a few things she can try outpatient when she is ready to quit smoking.  Total amount of time allocated on tobacco smoking cessation was 4 minutes.    Elevated brain natriuretic peptide (BNP) level  Assessment & Plan  proBNP 1089.  This could be elevated secondary to recent A-fib with RVR.  Does not appear to be volume overloaded has clear lungs    Leukocytosis  Assessment & Plan  WBC 17.1, trended down to 10    Hyponatremia  Assessment & Plan  Recheck tomorrow  Patient is on thiazide diuretic for her blood pressure    Total knee replacement status, right- (present on admission)  Assessment & Plan  -Done 9/3/2024  Doing well with mobility, pain adequately controlled  Postop hemoglobin has been stable on Eliquis loading         VTE prophylaxis: Loading with Eliquis due to bilateral PE    I have performed a physical exam and reviewed and updated ROS and Plan today (9/7/2024). In review of yesterday's note (9/6/2024), there are no changes except as documented above.

## 2024-09-07 NOTE — PROGRESS NOTES
Subjective:    Afib event overnight, rate controlled now.  Pain reasonably controlled.  Mobilizing well. No specific complaints    Objective:  BP (!) 152/73   Pulse 71   Temp 36.4 °C (97.5 °F) (Temporal)   Resp 16   Ht 1.829 m (6')   Wt 106 kg (233 lb 0.4 oz)   SpO2 90%     Recent Labs     09/05/24  0127 09/06/24  0136 09/07/24  0253   WBC 10.8 10.9* 10.0   RBC 4.39 4.18* 4.54   HEMOGLOBIN 12.9 12.3 13.4   HEMATOCRIT 39.1 36.8* 39.6   MCV 89.1 88.0 87.2   MCH 29.4 29.4 29.5   MCHC 33.0 33.4 33.8   RDW 45.6 45.1 44.8   PLATELETCT 195 197 231   MPV 10.2 10.2 10.1     Recent Labs     09/06/24  0136   SODIUM 131*   POTASSIUM 3.3*   CHLORIDE 94*   CO2 22   GLUCOSE 108*   BUN 13   CREATININE 0.64   CALCIUM 8.5         Intake/Output Summary (Last 24 hours) at 9/7/2024 0839  Last data filed at 9/7/2024 0802  Gross per 24 hour   Intake 320 ml   Output --   Net 320 ml       Comfortable, no distress  Neurologically and vascularly intact with palpable pedal pulses bilaterally.  Dressing C/D/I      Assessment:  Doing well s/p total knee arthroplasty with a-fib post operatively.    Plan:    Diet as tolerated  WBAT  PT/OT  Reviewed knee ROM including terminal flexion and extension exercises  DVT ppx - per hospitalist  Plan to discharge home when medically stable  Follow-up in approximately 2 weeks post-op.  [phone]

## 2024-09-07 NOTE — PROGRESS NOTES
Hospital Medicine Daily Progress Note    Date of Service  9/6/2024    Chief Complaint  Josie Vale is a 73 y.o. female admitted 9/3/2024 with initially A-fib and RVR postop right TKA, after being treated she developed significant persistent bradycardia.  She was also noted to have leukocytosis and hyponatremia patient did not sleep well last night blood pressure is elevated today heart rate in the low 50s    Hospital Course  Patient admitted to telemetry due to bradycardia following RVR.  She remained bradycardic overnight, beta-blockers were subsequently discontinued, she was seen by cardiology who have since signed off, echocardiogram overall unremarkable.      Interval Problem Update  No further tachycardia overnight but did get bradycardic down to the 40s and amiodarone drip was discontinued.  Hemoglobin stable overnight as well, WBC down to 10.9 sodium is 131 but she was resumed on HCTZ.  Daughter and spouse are at bedside, plan is for her to discharge home tomorrow provided she has no further derangements and her heart rate.  Patient's mobility is improving.    I have discussed this patient's plan of care and discharge plan at IDT rounds today with Case Management, Nursing, Nursing leadership, and other members of the IDT team.    Consultants/Specialty  cardiology and orthopedics    Code Status  Full Code    Disposition  The patient is not medically cleared for discharge to home or a post-acute facility.      I have placed the appropriate orders for post-discharge needs.    Review of Systems  Review of Systems   Constitutional:  Negative for chills and fever.   Respiratory:  Negative for shortness of breath.    Cardiovascular:  Negative for chest pain and palpitations.        No pleuritic chest pain   Gastrointestinal:  Negative for abdominal pain, nausea and vomiting.   Musculoskeletal:         Right leg pain-well controlled   Neurological:  Negative for dizziness and headaches.        Physical  Exam  Temp:  [36.4 °C (97.5 °F)-36.8 °C (98.2 °F)] 36.8 °C (98.2 °F)  Pulse:  [50-64] 60  Resp:  [16-18] 18  BP: (104-156)/(44-79) 140/72  SpO2:  [90 %-95 %] 93 %    Physical Exam  Vitals and nursing note reviewed.   Constitutional:       General: She is not in acute distress.     Appearance: She is obese. She is not ill-appearing.   HENT:      Head: Normocephalic and atraumatic.      Nose: Nose normal.      Mouth/Throat:      Mouth: Mucous membranes are moist.   Eyes:      Extraocular Movements: Extraocular movements intact.      Pupils: Pupils are equal, round, and reactive to light.   Cardiovascular:      Rate and Rhythm: Normal rate. Rhythm irregular.      Comments: Borderline bradycardic  Pulmonary:      Effort: Pulmonary effort is normal.      Breath sounds: Normal breath sounds.   Abdominal:      General: Bowel sounds are normal. There is no distension.      Palpations: Abdomen is soft.      Tenderness: There is no abdominal tenderness.   Musculoskeletal:      Right lower leg: No edema.      Left lower leg: No edema.      Comments: Postop dressing unchanged   Skin:     General: Skin is warm and dry.   Neurological:      General: No focal deficit present.      Mental Status: She is alert and oriented to person, place, and time.      Cranial Nerves: No cranial nerve deficit.   Psychiatric:         Mood and Affect: Mood normal.         Behavior: Behavior normal.         Fluids    Intake/Output Summary (Last 24 hours) at 9/6/2024 1758  Last data filed at 9/6/2024 1200  Gross per 24 hour   Intake 360 ml   Output --   Net 360 ml        Laboratory  Recent Labs     09/04/24  0310 09/05/24  0127 09/06/24  0136   WBC 15.8* 10.8 10.9*   RBC 4.45 4.39 4.18*   HEMOGLOBIN 13.2 12.9 12.3   HEMATOCRIT 40.0 39.1 36.8*   MCV 89.9 89.1 88.0   MCH 29.7 29.4 29.4   MCHC 33.0 33.0 33.4   RDW 45.8 45.6 45.1   PLATELETCT 188 195 197   MPV 10.2 10.2 10.2     Recent Labs     09/03/24  2302 09/04/24  0310 09/06/24  0136   SODIUM 132*  134* 131*   POTASSIUM 4.2 4.0 3.3*   CHLORIDE 96 96 94*   CO2 23 24 22   GLUCOSE 94 100* 108*   BUN 16 15 13   CREATININE 0.70 0.69 0.64   CALCIUM 8.9 9.0 8.5                   Imaging  CT-CTA CHEST PULMONARY ARTERY W/ RECONS   Final Result      1.  Bilateral pulmonary emboli.   2.  No CT evidence of right heart strain.   3.  Reflux of intravenous contrast in the hepatic veins suggestive of right heart dysfunction.   4.  Atherosclerosis with coronary artery disease.   5.  Reticular nodular right upper lobe opacities with coarse calcification likely on the basis of prior granulomatous infection.   6.  Nonspecific enlarged precarinal lymph node. Findings can be seen in the setting of infection, inflammation or neoplasm. Follow-up as clinically appropriate.      Findings were conveyed to Dr. TIFFANY VARGAS on 9/5/2024 4:06 PM via electronic messaging.            EC-ECHOCARDIOGRAM COMPLETE W/O CONT   Final Result           Assessment/Plan  * Bradycardia  Assessment & Plan  Patient still had bradycardia overnight, amiodarone drip stopped, so far good today, will start her on amiodarone 200 mg daily tomorrow    Bilateral pulmonary embolism (HCC)- (present on admission)  Assessment & Plan  Tolerating Eliquis, no evidence of bleeding    Other insomnia  Assessment & Plan  Patient's blood pressure is up today and she states that she is not feeling well due to not sleeping well preoperative and not sleeping at all last night due to frequent interruptions and noise.  She is agreeable with trying something tonight for sleep, will start her on trazodone    Atrial fibrillation with RVR (HCC)- (present on admission)  Assessment & Plan  Rate seems better controlled but did have bradycardic episode overnight, no tachycardia, have stopped amiodarone drip and will resume oral amiodarone tomorrow, if she has no further tacchy or bradycardia episodes can discharge tomorrow    Encounter for smoking cessation counseling  Assessment &  Plan  -Patient is a smoking for more than 50 years.  She states that she will maybe consider quit smoking at some point and I offered to give her nicotine patch which she accepted.  We talked about a few things she can try outpatient when she is ready to quit smoking.  Total amount of time allocated on tobacco smoking cessation was 4 minutes.    Elevated brain natriuretic peptide (BNP) level  Assessment & Plan  proBNP 1089.  This could be elevated secondary to recent A-fib with RVR.  Does not appear to be volume overloaded has clear lungs    Leukocytosis  Assessment & Plan  WBC 17.1, trended down to 10    Hyponatremia  Assessment & Plan  -Laboratory showing sodium of 132.  131 today likely due to HCTZ    Total knee replacement status, right- (present on admission)  Assessment & Plan  -Done 9/3/2024  -Plan per orthopedic team.  Regional block wearing off, patient trying to hold off on using opioids but understands she may need to in order to do her therapy         VTE prophylaxis: Loading with Eliquis due to bilateral PE    I have performed a physical exam and reviewed and updated ROS and Plan today (9/6/2024). In review of yesterday's note (9/5/2024), there are no changes except as documented above.

## 2024-09-07 NOTE — CARE PLAN
The patient is Watcher - Medium risk of patient condition declining or worsening    Shift Goals  Clinical Goals: monitor HR and rhythm, pain control  Patient Goals: go home, comfort  Family Goals: serenity    Progress made toward(s) clinical / shift goals:    Problem: Pain - Standard  Goal: Alleviation of pain or a reduction in pain to the patient’s comfort goal  Outcome: Progressing  Note: Pt pain level will be monitored and controlled with prn medication.     Problem: Fall Risk  Goal: Patient will remain free from falls  Outcome: Progressing  Note: Fall precautions in place. Pt will remain free of falls.       Patient is not progressing towards the following goals:

## 2024-09-07 NOTE — CARE PLAN
The patient is Stable - Low risk of patient condition declining or worsening    Shift Goals  Clinical Goals: monitor HR  Patient Goals: go home, rest  Family Goals: THERON    Progress made toward(s) clinical / shift goals:  pt in SR, SB    Problem: Knowledge Deficit - Standard  Goal: Patient and family/care givers will demonstrate understanding of plan of care, disease process/condition, diagnostic tests and medications  Outcome: Progressing     Problem: Pain - Standard  Goal: Alleviation of pain or a reduction in pain to the patient’s comfort goal  Outcome: Progressing  Note: Patient's pain medicated per MAR     Problem: Mobility  Goal: Risk for activity intolerance will decrease  Outcome: Progressing     Patient is not progressing towards the following goals:

## 2024-09-08 ENCOUNTER — PHARMACY VISIT (OUTPATIENT)
Dept: PHARMACY | Facility: MEDICAL CENTER | Age: 74
End: 2024-09-08
Payer: COMMERCIAL

## 2024-09-08 VITALS
HEART RATE: 66 BPM | SYSTOLIC BLOOD PRESSURE: 146 MMHG | RESPIRATION RATE: 18 BRPM | BODY MASS INDEX: 30.82 KG/M2 | WEIGHT: 227.51 LBS | DIASTOLIC BLOOD PRESSURE: 75 MMHG | TEMPERATURE: 98.3 F | HEIGHT: 72 IN | OXYGEN SATURATION: 93 %

## 2024-09-08 LAB
ANION GAP SERPL CALC-SCNC: 11 MMOL/L (ref 7–16)
BUN SERPL-MCNC: 13 MG/DL (ref 8–22)
CALCIUM SERPL-MCNC: 8.9 MG/DL (ref 8.4–10.2)
CHLORIDE SERPL-SCNC: 93 MMOL/L (ref 96–112)
CO2 SERPL-SCNC: 24 MMOL/L (ref 20–33)
CREAT SERPL-MCNC: 0.61 MG/DL (ref 0.5–1.4)
ERYTHROCYTE [DISTWIDTH] IN BLOOD BY AUTOMATED COUNT: 43.8 FL (ref 35.9–50)
GFR SERPLBLD CREATININE-BSD FMLA CKD-EPI: 94 ML/MIN/1.73 M 2
GLUCOSE SERPL-MCNC: 103 MG/DL (ref 65–99)
HCT VFR BLD AUTO: 37.3 % (ref 37–47)
HGB BLD-MCNC: 12.6 G/DL (ref 12–16)
MCH RBC QN AUTO: 29.6 PG (ref 27–33)
MCHC RBC AUTO-ENTMCNC: 33.8 G/DL (ref 32.2–35.5)
MCV RBC AUTO: 87.8 FL (ref 81.4–97.8)
PLATELET # BLD AUTO: 259 K/UL (ref 164–446)
PMV BLD AUTO: 9.8 FL (ref 9–12.9)
POTASSIUM SERPL-SCNC: 4 MMOL/L (ref 3.6–5.5)
RBC # BLD AUTO: 4.25 M/UL (ref 4.2–5.4)
SODIUM SERPL-SCNC: 128 MMOL/L (ref 135–145)
WBC # BLD AUTO: 10.9 K/UL (ref 4.8–10.8)

## 2024-09-08 PROCEDURE — A9270 NON-COVERED ITEM OR SERVICE: HCPCS | Performed by: HOSPITALIST

## 2024-09-08 PROCEDURE — 97116 GAIT TRAINING THERAPY: CPT

## 2024-09-08 PROCEDURE — A9270 NON-COVERED ITEM OR SERVICE: HCPCS | Performed by: ORTHOPAEDIC SURGERY

## 2024-09-08 PROCEDURE — 80048 BASIC METABOLIC PNL TOTAL CA: CPT

## 2024-09-08 PROCEDURE — 700102 HCHG RX REV CODE 250 W/ 637 OVERRIDE(OP): Performed by: HOSPITALIST

## 2024-09-08 PROCEDURE — 36415 COLL VENOUS BLD VENIPUNCTURE: CPT

## 2024-09-08 PROCEDURE — RXMED WILLOW AMBULATORY MEDICATION CHARGE: Performed by: INTERNAL MEDICINE

## 2024-09-08 PROCEDURE — A9270 NON-COVERED ITEM OR SERVICE: HCPCS | Performed by: INTERNAL MEDICINE

## 2024-09-08 PROCEDURE — 99024 POSTOP FOLLOW-UP VISIT: CPT | Performed by: STUDENT IN AN ORGANIZED HEALTH CARE EDUCATION/TRAINING PROGRAM

## 2024-09-08 PROCEDURE — 99239 HOSP IP/OBS DSCHRG MGMT >30: CPT | Performed by: INTERNAL MEDICINE

## 2024-09-08 PROCEDURE — 700102 HCHG RX REV CODE 250 W/ 637 OVERRIDE(OP): Performed by: ORTHOPAEDIC SURGERY

## 2024-09-08 PROCEDURE — 85027 COMPLETE CBC AUTOMATED: CPT

## 2024-09-08 PROCEDURE — 700102 HCHG RX REV CODE 250 W/ 637 OVERRIDE(OP): Performed by: INTERNAL MEDICINE

## 2024-09-08 PROCEDURE — 94760 N-INVAS EAR/PLS OXIMETRY 1: CPT

## 2024-09-08 RX ORDER — NICOTINE 21 MG/24HR
1 PATCH, TRANSDERMAL 24 HOURS TRANSDERMAL EVERY 24 HOURS
Qty: 30 PATCH | Refills: 1 | Status: SHIPPED | OUTPATIENT
Start: 2024-09-08

## 2024-09-08 RX ORDER — DILTIAZEM HCL 60 MG
60 TABLET ORAL EVERY 6 HOURS
Qty: 120 TABLET | Refills: 1 | Status: SHIPPED | OUTPATIENT
Start: 2024-09-08

## 2024-09-08 RX ORDER — AMIODARONE HYDROCHLORIDE 200 MG/1
200 TABLET ORAL 2 TIMES DAILY
Qty: 60 TABLET | Refills: 1 | Status: SHIPPED | OUTPATIENT
Start: 2024-09-08

## 2024-09-08 RX ORDER — HYDROCHLOROTHIAZIDE 25 MG/1
25 TABLET ORAL DAILY
Qty: 30 TABLET | Refills: 1 | Status: SHIPPED | OUTPATIENT
Start: 2024-09-09

## 2024-09-08 RX ADMIN — DILTIAZEM HYDROCHLORIDE 60 MG: 30 TABLET, FILM COATED ORAL at 11:34

## 2024-09-08 RX ADMIN — DOCUSATE SODIUM 100 MG: 100 CAPSULE, LIQUID FILLED ORAL at 04:34

## 2024-09-08 RX ADMIN — APIXABAN 10 MG: 5 TABLET, FILM COATED ORAL at 04:34

## 2024-09-08 RX ADMIN — OXYCODONE HYDROCHLORIDE 10 MG: 10 TABLET ORAL at 01:07

## 2024-09-08 RX ADMIN — HYDROCHLOROTHIAZIDE 25 MG: 25 TABLET ORAL at 04:34

## 2024-09-08 RX ADMIN — NICOTINE TRANSDERMAL SYSTEM 21 MG: 21 PATCH, EXTENDED RELEASE TRANSDERMAL at 04:34

## 2024-09-08 RX ADMIN — CLONIDINE HYDROCHLORIDE 0.1 MG: 0.1 TABLET ORAL at 00:04

## 2024-09-08 ASSESSMENT — COGNITIVE AND FUNCTIONAL STATUS - GENERAL
SUGGESTED CMS G CODE MODIFIER MOBILITY: CH
MOBILITY SCORE: 24

## 2024-09-08 ASSESSMENT — FIBROSIS 4 INDEX: FIB4 SCORE: 1.945945945945945946

## 2024-09-08 ASSESSMENT — GAIT ASSESSMENTS
DEVIATION: STEP TO
DISTANCE (FEET): 200
ASSISTIVE DEVICE: FRONT WHEEL WALKER
GAIT LEVEL OF ASSIST: SUPERVISED

## 2024-09-08 ASSESSMENT — PAIN DESCRIPTION - PAIN TYPE
TYPE: ACUTE PAIN
TYPE: ACUTE PAIN;SURGICAL PAIN
TYPE: ACUTE PAIN

## 2024-09-08 NOTE — PROGRESS NOTES
Telemetry Shift Summary     Rhythm: SB-SR  Rate: 46-71 down to 42  Measurements: 0.18/0.08/0.46  Ectopy (reported by Monitor Tech): rPAC, rPVC     Normal Values  Rhythm: Sinus  HR:   Measurements: 0.12-0.20/0.06-0.10/0.30-0.52

## 2024-09-08 NOTE — PROGRESS NOTES
Subjective:    Episode of Bradycardia last night.  Pain reasonably controlled.  Mobilizing well. No fevers or chills    Objective:  BP (!) 165/75   Pulse 70   Temp 36.4 °C (97.5 °F) (Temporal)   Resp 16   Ht 1.829 m (6')   Wt 103 kg (227 lb 8.2 oz)   SpO2 90%     Recent Labs     09/06/24  0136 09/07/24  0253 09/08/24  0148   WBC 10.9* 10.0 10.9*   RBC 4.18* 4.54 4.25   HEMOGLOBIN 12.3 13.4 12.6   HEMATOCRIT 36.8* 39.6 37.3   MCV 88.0 87.2 87.8   MCH 29.4 29.5 29.6   MCHC 33.4 33.8 33.8   RDW 45.1 44.8 43.8   PLATELETCT 197 231 259   MPV 10.2 10.1 9.8     Recent Labs     09/06/24  0136 09/08/24  0148   SODIUM 131* 128*   POTASSIUM 3.3* 4.0   CHLORIDE 94* 93*   CO2 22 24   GLUCOSE 108* 103*   BUN 13 13   CREATININE 0.64 0.61   CALCIUM 8.5 8.9       No intake or output data in the 24 hours ending 09/08/24 0938    Comfortable, no distress  Neurologically and vascularly intact with palpable pedal pulses bilaterally.  Dressing C/D/I      Assessment:  s/p total knee arthroplasty.    Plan:    Diet as tolerated  WBAT  PT/OT  Reviewed knee ROM including terminal flexion and extension exercises  DVT ppx - per hospitalist  Appreciate medicine following patient  Plan to discharge home when medically stable, cleared from an ortho standpoint  Follow-up in approximately 2 weeks post-op.  [phone]

## 2024-09-08 NOTE — DISCHARGE SUMMARY
Discharge Summary    CHIEF COMPLAINT ON ADMISSION  No chief complaint on file.      Reason for Admission  RVR    Admission Date  9/3/2024    CODE STATUS  Full Code    HPI & HOSPITAL COURSE  This is a 73 y.o. female here with advanced osteoarthritis of the right knee who had elective right knee TKA, postoperatively she developed A-fib with RVR therefore required admission to the hospital.  Over the next several days patient was started on various antiarrhythmics, with beta-blockers even at very low dose (12.5 mg metoprolol) she would mikki down into the low 40s.  Her heart rate was frequently into the mid 100s and she was started on amiodarone drip, sometimes at night she would still be tachycardic even on her amiodarone drip and on 1 occasion received 2 additional boluses in the morning.  Due to the inability to control her heart rate adequately on amiodarone diltiazem was added at times patient would still be bradycardic into the 40s and 50s however when her amiodarone or diltiazem was held she would become tachycardic due to the difficulty controlling her heart rate, D-dimer was assessed and unfortunately was positive leading to a CTA which did demonstrate bilateral PE. patient had an echo which did not demonstrate RV strain however she was started on full dose Eliquis loading for her bilateral PE with the hopes that once she started to have some clot stabilization and resolution that her heart rate would be under better control but this did not occur.  Since her tachycardia and bradycardia was causing medications to be held or increased alternating every other day, we are not making any headway with getting her rate stabilized.  Cardioversion not an option because at times patient would convert to sinus rhythm but then would revert to A-fib.    I explained to her that in a normal situation where she did not just have a knee replacement she would probably be considered for pacemaker using a beta-blocker to control  her fast rate and a pacemaker to keep her from going to slow however given that she just had a knee implantation, there is risk for postoperative infection and that in turn could adversely affect a pacemaker and vice versa.    I think at this point the best case scenario is to send her out with amiodarone p.o. and diltiazem every 6 hours to be held only if she is bradycardic.  If patient becomes symptomatic with her bradycardia such as lightheadedness dizziness or unsteadiness she may need to see cardiology in the near future to see if anything further can be done with her rate, however at this time I think it is reasonable for her to progress with her recovery for her knee and follow-up with cardiology in approximately 1 month.    It is likely patient had underlying A-fib intermittently in the past which was exacerbated by PE as well as surgery so even after resolution of her bilateral PE she probably should remain on chronic anticoagulation but this can be determined cardiology at a later date patient understood limitations of treatment at this point and was agreeable with discharge to home..    Since being in the hospital she has not slept well and hopes that she will get better rest there      Therefore, she is discharged in good and stable condition to home with close outpatient follow-up.    The patient met 2-midnight criteria for an inpatient stay at the time of discharge.    Discharge Date  9/8/2024    FOLLOW UP ITEMS POST DISCHARGE  Ortho  PT  Primary care  Cardiology    DISCHARGE DIAGNOSES  Principal Problem:    Bradycardia (POA: Unknown)  Active Problems:    Total knee replacement status, right (POA: Yes)    Hyponatremia (POA: Unknown)    Leukocytosis (POA: Unknown)    Elevated brain natriuretic peptide (BNP) level (POA: Unknown)    Encounter for smoking cessation counseling (POA: Unknown)    Atrial fibrillation with RVR (HCC) (POA: Yes)    Other insomnia (POA: No)    Bilateral pulmonary embolism (HCC)  (POA: Yes)    Hypokalemia (POA: Clinically Undetermined)  Resolved Problems:    Osteoarthritis, knee (POA: Unknown)    Primary osteoarthritis of right knee (POA: Unknown)      FOLLOW UP  Future Appointments   Date Time Provider Department Center   9/10/2024 10:00 AM Bindu Jones, PT [facility]   9/18/2024  8:45 AM Chad Castellanos P.A.-C. [facility]     No follow-up provider specified.    MEDICATIONS ON DISCHARGE     Medication List        START taking these medications        Instructions   amiodarone 200 MG Tabs  Commonly known as: Cordarone   Take 1 Tablet by mouth 2 times a day.  Dose: 200 mg     apixaban 5mg Tabs  Start taking on: September 8, 2024  Commonly known as: Eliquis   Take 2 Tablets by mouth 2 times a day for 4 days, THEN 1 Tablet 2 times a day for 30 days. Indications: DVT/PE     aspirin 81 MG Chew chewable tablet  Commonly known as: Asa   Chew 1 Tablet 2 times a day for 28 days.  Dose: 81 mg     dilTIAZem 60 MG Tabs  Commonly known as: Cardizem   Take 1 Tablet by mouth every 6 hours. HOLD DOSE IF HEART RATE UNDER 55  Dose: 60 mg     docusate sodium 100 MG Caps  Commonly known as: Colace   Take 1 Capsule by mouth 2 times a day for 30 days.  Dose: 100 mg     hydroCHLOROthiazide 25 MG Tabs  Start taking on: September 9, 2024   Take 1 Tablet by mouth every day.  Dose: 25 mg     meloxicam 7.5 MG Tabs  Commonly known as: Mobic   Take 1 Tablet by mouth every day for 30 days.  Dose: 7.5 mg     nicotine 21 MG/24HR Pt24  Commonly known as: Nicoderm   Place 1 Patch on the skin every 24 hours.  Dose: 1 Patch            CONTINUE taking these medications        Instructions   acetaminophen 500 MG Tabs  Commonly known as: Tylenol   Take 500-1,000 mg by mouth every 6 hours as needed.       MEDICATION INSTRUCTIONS FOR SURGERY/PROCEDURE SCHEDULED FOR 9/3/24   OK TO CONTINUE TAKING PRIOR TO SURGERY AND DAY OF SURGERY  Dose: 500-1,000 mg     Calcium 200 MG Tabs   Take  by mouth every day.        MEDICATION INSTRUCTIONS FOR SURGERY/PROCEDURE SCHEDULED FOR 9/3/24   DO NOT TAKE 7 DAYS PRIOR TO SURGERY     coenzyme Q-10 30 MG capsule   Take 60 mg by mouth every day.       MEDICATION INSTRUCTIONS FOR SURGERY/PROCEDURE SCHEDULED FOR 9/3/24   DO NOT TAKE 7 DAYS PRIOR TO SURGERY  Dose: 60 mg     fish oil 1000 MG Caps capsule   Take 1,000 mg by mouth 3 times a day with meals.       MEDICATION INSTRUCTIONS FOR SURGERY/PROCEDURE SCHEDULED FOR 9/3/24   DO NOT TAKE 7 DAYS PRIOR TO SURGERY  Dose: 1,000 mg     loratadine 10 MG Tabs  Commonly known as: Claritin   Take 10 mg by mouth as needed.         MEDICATION INSTRUCTIONS FOR SURGERY/PROCEDURE SCHEDULED FOR 9/3/24   OK TO CONTINUE TAKING PRIOR TO SURGERY AND DAY OF SURGERY  Dose: 10 mg     multivitamin Tabs   Take 1 Tablet by mouth every day.       MEDICATION INSTRUCTIONS FOR SURGERY/PROCEDURE SCHEDULED FOR 9/3/24   DO NOT TAKE 7 DAYS PRIOR TO SURGERY  Dose: 1 Tablet            ASK your doctor about these medications        Instructions   oxyCODONE immediate-release 5 MG Tabs  Commonly known as: Roxicodone  Ask about: Should I take this medication?   Take 1-1.5 Tablets by mouth every 6 hours as needed for Severe Pain for up to 7 days.  Dose: 5-7.5 mg     traMADol 50 MG Tabs  Commonly known as: Ultram  Ask about: Should I take this medication?   Take 1-1.5 Tablets by mouth every 6 hours as needed for Moderate Pain for up to 7 days. Max 5 tablet per day  Dose: 50-75 mg              Allergies  Allergies   Allergen Reactions    Diclofenac Diarrhea     DIAHRREA    Seasonal Runny Nose     ITCHING, EYES, SNEEZING    Tumeric Diarrhea     DIARRHEA    Beta Adrenergic Blockers      Bradycardia even at low doses       DIET  Orders Placed This Encounter   Procedures    Diet Order Diet: Regular     Standing Status:   Standing     Number of Occurrences:   1     Order Specific Question:   Diet:     Answer:   Regular [1]       ACTIVITY  As directed by  orthopedics    CONSULTATIONS  Cardiology    PROCEDURES    [address]   [place] [id number]       Josie Vale   MRN: [id number], : 1950 , Sex: F   Admit: 9/3/2024, D/C:           Federico Byrd M.D.  Physician  Surgery Orthopedic     OP Report     Signed     Date of Service: 9/3/2024  8:43 AM          Case Time: Procedures: Surgeons:   9/3/2024  8:43 AM ROBOTIC RIGHT TOTAL KNEE ARTHROPLASTY    Federico Byrd M.D.                   Preop Diagnosis: Advanced right knee arthritis  Postop Diagnosis:  Same  Procedure: Right total knee arthroplasty, Use of intraoperative robotic navigation for knee replacement  Surgeon: Dr. Federico Byrd MD  Assistant: Chad Castellanos PA-C  Anesthesia: Dr. Cohn. General + Adductor canal block  Estimated Blood Loss: 50cc  Drains: None  Complications: None    Implants: Greta Triathlon CR Cementless, size 4 femur, size 4 tibia, with a 10 mm insert and 38 oval patella.    Indications: Josie Vale is a 73 y.o. female who has had severe progressive knee pain that failed conservative management.  There were severe degenerative changes on radiographs.  We discussed the options and she was ultimately indicated for knee replacement.  We discussed the risk of bleeding, transfusion, pain, neurovascular injury, stiffness, fracture, infection, wound complication, loosening of the parts over time, blood clots, and medical complication.  No guarantees were made and she elected to proceed.    Pertinent Findings and Releases: There were severe degenerative changes with preoperative stiffness and flexion contracture.  At the end of the case, the knee easily came to full extension and flexed up to calf/thigh impingement with the arthrotomy closed.  The patella tracked centrally.    Informed consent and site marking were confirmed in preop.  An adductor canal block had been performed by the anesthesiologist, and then she was brought back to the OR where anesthesia was performed.  Supine positioning was perfmored with all bony prominences well padded with a padded tourniquet on the thigh.  The extremity was prepped and draped in the usual sterile fashion. I performed a pre-procedure timeout to confirm we had the correct patient, side, site, procedure, and presence of necessary personal and equipment.  I confirmed that Ancef and tranexamic acid were given.  The tourniquet was then inflated.    A midline incision was made medial to the tibial tubercle centered over patella taken down to the deep capsule of the knee. A short medial parapatellar arthrotomy was performed.  The fat pad was released. The patella was subluxated and the knee was flexed. The remnants of the anterior horn of the medial and lateral meniscus were removed.  Two pins were placed into the proximal tibia within the incision and two additional pins were placed into the femoral diaphysis through stab incisions proximal to the knee incision to dock the robotic sensors.  The knee was then registered and taken through a range of motion to gauge the ligament balance.  The surgical plan was then modified on the computer, and all of the bone cuts were made without any difficulties.  The knee was then tensed at 90 degrees and the meniscal remnants and posterior osteophytes were removed.  The posterior capsule was injected with a local anesthetic cocktail.  The tibia was then subluxed forward, sized, and punched in the appropriate amount of external rotation and mechanical alignment was verified using a drop lilliana. Trials were placed, the knee was reduced with a trial insert, it was taken through a range of motion, and found to be stable in the varus/valgus plane 0-30 degrees of flexion and the AP plane at 90 degrees of flexion. Attention was then turned to the patella. A symmetric resection was performed to recreate native patella height and appropriately sized. All extraneous osteophyte and synovium were removed.  With a trial in  place, the patella tracked centrally using the no thumbs technique. All trial components were removed. The real components were impacted with a great press-fit.  The tourniquet was let down and hemostasis ensured. The real insert was placed. Stability and patellar tracking were excellent. The knee was then copiously irrigated. One gram of Vancomycin was left in the wound.  The arthrotomy was closed with number 2 running barbed suture, and the wound was closed in layers. An occlusive silver dressing was applied and the patient was turned over to anesthesia in stable condition without any apparent intraoperative complications.    Plan:  WBAT with a walker (which will need to be provided if they do not already have one due to weakness, imbalance, and fall risk), PT/OT evaluation, Aspirin 81mg BID for 4 weeks for DVT prophylaxis, Leave dressing in place until followup.                       LABORATORY  Lab Results   Component Value Date    SODIUM 128 (L) 09/08/2024    POTASSIUM 4.0 09/08/2024    CHLORIDE 93 (L) 09/08/2024    CO2 24 09/08/2024    GLUCOSE 103 (H) 09/08/2024    BUN 13 09/08/2024    CREATININE 0.61 09/08/2024        Lab Results   Component Value Date    WBC 10.9 (H) 09/08/2024    HEMOGLOBIN 12.6 09/08/2024    HEMATOCRIT 37.3 09/08/2024    PLATELETCT 259 09/08/2024        Total time of the discharge process exceeds 35 minutes.

## 2024-09-08 NOTE — CARE PLAN
The patient is Watcher - Medium risk of patient condition declining or worsening    Shift Goals  Clinical Goals: monitor HR and rhythm, pain control, ambulate  Patient Goals: control HR and rhythm  Family Goals: serenity    Progress made toward(s) clinical / shift goals:    Problem: Pain - Standard  Goal: Alleviation of pain or a reduction in pain to the patient’s comfort goal  Outcome: Progressing  Note: Pt pain level will be monitored and controlled with prn medication.     Problem: Fall Risk  Goal: Patient will remain free from falls  Outcome: Progressing  Note: Fall precautions in place. Pt will remain free of falls.       Patient is not progressing towards the following goals:

## 2024-09-08 NOTE — CARE PLAN
The patient is Stable - Low risk of patient condition declining or worsening    Shift Goals  Clinical Goals: Monitor HR and rhythm, pain management  Patient Goals: rest, pain management  Family Goals: serenity    Progress made toward(s) clinical / shift goals:  Tele monitored, pt in SR/ SB. Rate control meds given    Problem: Knowledge Deficit - Standard  Goal: Patient and family/care givers will demonstrate understanding of plan of care, disease process/condition, diagnostic tests and medications  Outcome: Progressing     Problem: Pain - Standard  Goal: Alleviation of pain or a reduction in pain to the patient’s comfort goal  Outcome: Progressing     Problem: Fall Risk  Goal: Patient will remain free from falls  Outcome: Progressing     Patient is not progressing towards the following goals:

## 2024-09-08 NOTE — THERAPY
Physical Therapy   Discharge     Patient Name: Josie Vale  Age:  73 y.o., Sex:  female  Medical Record #: [id number]  Today's Date: 9/8/2024     Precautions  Precautions: Fall Risk;Weight Bearing As Tolerated Right Lower Extremity    Assessment    Pt demonstrated ability to perform bed mobility, transfers and gait without any physical assist. All questions were answered in regard to care of TKA post hospitalization. Pt is set up with OP PT and has a FWW at home. Pt has met all goals, no acute care PT needs.     Plan    Reason for Discharge From Therapy: Discharge Secondary to Goals Met    DC Equipment Recommendations: None  Discharge Recommendations: Recommend outpatient physical therapy services to address higher level deficits       Objective     09/08/24 1033   Precautions   Precautions Fall Risk;Weight Bearing As Tolerated Right Lower Extremity   Pain 0 - 10 Group   Therapist Pain Assessment During Activity;Nurse Notified  (states stiffness upon standing, but loosened up over time)   Cognition    Cognition / Consciousness WDL   Other Treatments   Other Treatments Provided Discussed DC home and answered all questions/concerns about DC and care of TKA   Balance   Sitting Balance (Static) Good   Sitting Balance (Dynamic) Good   Standing Balance (Static) Fair +   Standing Balance (Dynamic) Fair +   Weight Shift Sitting Good   Weight Shift Standing Good   Skilled Intervention Verbal Cuing   Bed Mobility    Supine to Sit Modified Independent   Sit to Supine Modified Independent   Scooting Modified Independent   Skilled Intervention Verbal Cuing   Gait Analysis   Gait Level Of Assist Supervised   Assistive Device Front Wheel Walker   Distance (Feet) 200   # of Times Distance was Traveled 1   Deviation Step To   Weight Bearing Status WBAT   Functional Mobility   Sit to Stand Supervised   Bed, Chair, Wheelchair Transfer Supervised   Mobility gait   6 Clicks Assessment - How much HELP from from another person do you  currently need... (If the patient hasn't done an activity recently, how much help from another person do you think he/she would need if he/she tried?)   Turning from your back to your side while in a flat bed without using bedrails? 4   Moving from lying on your back to sitting on the side of a flat bed without using bedrails? 4   Moving to and from a bed to a chair (including a wheelchair)? 4   Standing up from a chair using your arms (e.g., wheelchair, or bedside chair)? 4   Walking in hospital room? 4   Climbing 3-5 steps with a railing? 4   6 clicks Mobility Score 24   Activity Tolerance   Standing 30 min   Short Term Goals    Short Term Goal # 1 Pt will be able to perform bed mobility and sup <> sit Erika in 6 visits.   Goal Outcome # 1 Goal met   Short Term Goal # 2 Pt will be able to perform sit <> stand and transfer Erika in 6 visits.   Goal Outcome # 2 Goal met   Short Term Goal # 3 Pt will be able to ambulate 150 ft with FWW Erika in 6 visits.   Goal Outcome # 3 Goal met   Short Term Goal # 4 Pt will be able to go up/down 2 steps SBA in 6 visits.   Goal Outcome # 4 Goal met   Education Group   Education Provided Role of Physical Therapist   Role of Physical Therapist Patient Response Patient;Acceptance;Explanation;Demonstration;Verbal Demonstration;Action Demonstration   Physical Therapy Treatment Plan   Reason For Discharge Discharge Secondary to Goals Met   Anticipated Discharge Equipment and Recommendations   DC Equipment Recommendations None   Discharge Recommendations Recommend outpatient physical therapy services to address higher level deficits   Interdisciplinary Plan of Care Collaboration   IDT Collaboration with  Nursing   Patient Position at End of Therapy In Bed;Family / Friend in Room;Tray Table within Reach;Call Light within Reach;Phone within Reach   Collaboration Comments PT treat   Session Information   Date / Session Number  9/8- DC goals met

## 2025-07-30 PROBLEM — M17.9 OSTEOARTHRITIS, KNEE: Status: ACTIVE | Noted: 2025-07-30

## 2025-07-30 PROBLEM — M17.12 PRIMARY OSTEOARTHRITIS OF LEFT KNEE: Status: ACTIVE | Noted: 2025-07-30

## 2025-08-07 ENCOUNTER — APPOINTMENT (OUTPATIENT)
Dept: ADMISSIONS | Facility: MEDICAL CENTER | Age: 75
End: 2025-08-07
Attending: ORTHOPAEDIC SURGERY
Payer: MEDICARE

## 2025-08-08 ENCOUNTER — PRE-ADMISSION TESTING (OUTPATIENT)
Dept: ADMISSIONS | Facility: MEDICAL CENTER | Age: 75
End: 2025-08-08
Attending: ORTHOPAEDIC SURGERY
Payer: MEDICARE

## 2025-08-08 VITALS — BODY MASS INDEX: 29.29 KG/M2 | HEIGHT: 72 IN

## 2025-08-08 DIAGNOSIS — Z01.812 PRE-OPERATIVE LABORATORY EXAMINATION: ICD-10-CM

## 2025-08-08 DIAGNOSIS — Z01.810 PRE-OPERATIVE CARDIOVASCULAR EXAMINATION: Primary | ICD-10-CM

## 2025-08-08 RX ORDER — HYDROCODONE BITARTRATE AND ACETAMINOPHEN 5; 325 MG/1; MG/1
TABLET ORAL
COMMUNITY
Start: 2025-08-05

## 2025-08-13 ENCOUNTER — PRE-ADMISSION TESTING (OUTPATIENT)
Dept: ADMISSIONS | Facility: MEDICAL CENTER | Age: 75
End: 2025-08-13
Attending: ORTHOPAEDIC SURGERY
Payer: MEDICARE

## 2025-08-13 DIAGNOSIS — Z01.812 PRE-OPERATIVE LABORATORY EXAMINATION: ICD-10-CM

## 2025-08-13 DIAGNOSIS — Z01.810 PRE-OPERATIVE CARDIOVASCULAR EXAMINATION: ICD-10-CM

## 2025-08-13 LAB
ANION GAP SERPL CALC-SCNC: 13 MMOL/L (ref 7–16)
BUN SERPL-MCNC: 17 MG/DL (ref 8–22)
CALCIUM SERPL-MCNC: 9.5 MG/DL (ref 8.5–10.5)
CHLORIDE SERPL-SCNC: 102 MMOL/L (ref 96–112)
CO2 SERPL-SCNC: 21 MMOL/L (ref 20–33)
CREAT SERPL-MCNC: 0.79 MG/DL (ref 0.5–1.4)
EKG IMPRESSION: NORMAL
ERYTHROCYTE [DISTWIDTH] IN BLOOD BY AUTOMATED COUNT: 46.2 FL (ref 35.9–50)
GFR SERPLBLD CREATININE-BSD FMLA CKD-EPI: 79 ML/MIN/1.73 M 2
GLUCOSE SERPL-MCNC: 87 MG/DL (ref 65–99)
HCT VFR BLD AUTO: 46.6 % (ref 37–47)
HGB BLD-MCNC: 15.7 G/DL (ref 12–16)
MCH RBC QN AUTO: 31.1 PG (ref 27–33)
MCHC RBC AUTO-ENTMCNC: 33.7 G/DL (ref 32.2–35.5)
MCV RBC AUTO: 92.3 FL (ref 81.4–97.8)
PLATELET # BLD AUTO: 230 K/UL (ref 164–446)
PMV BLD AUTO: 10.5 FL (ref 9–12.9)
POTASSIUM SERPL-SCNC: 4.4 MMOL/L (ref 3.6–5.5)
RBC # BLD AUTO: 5.05 M/UL (ref 4.2–5.4)
SCCMEC + MECA PNL NOSE NAA+PROBE: NEGATIVE
SCCMEC + MECA PNL NOSE NAA+PROBE: POSITIVE
SODIUM SERPL-SCNC: 136 MMOL/L (ref 135–145)
WBC # BLD AUTO: 11 K/UL (ref 4.8–10.8)

## 2025-08-13 PROCEDURE — 85027 COMPLETE CBC AUTOMATED: CPT | Mod: GA

## 2025-08-13 PROCEDURE — 93005 ELECTROCARDIOGRAM TRACING: CPT | Mod: TC

## 2025-08-13 PROCEDURE — 93010 ELECTROCARDIOGRAM REPORT: CPT | Performed by: INTERNAL MEDICINE

## 2025-08-13 PROCEDURE — 80048 BASIC METABOLIC PNL TOTAL CA: CPT

## 2025-08-13 PROCEDURE — 87640 STAPH A DNA AMP PROBE: CPT

## 2025-08-13 PROCEDURE — 87641 MR-STAPH DNA AMP PROBE: CPT

## 2025-08-13 PROCEDURE — 36415 COLL VENOUS BLD VENIPUNCTURE: CPT

## 2025-08-25 ENCOUNTER — HOSPITAL ENCOUNTER (OUTPATIENT)
Dept: RADIOLOGY | Facility: MEDICAL CENTER | Age: 75
End: 2025-08-25
Attending: ORTHOPAEDIC SURGERY
Payer: MEDICARE

## 2025-08-25 DIAGNOSIS — M17.12 PRIMARY OSTEOARTHRITIS OF LEFT KNEE: ICD-10-CM

## 2025-08-25 PROCEDURE — 73700 CT LOWER EXTREMITY W/O DYE: CPT | Mod: LT

## (undated) DEVICE — CANISTER SUCTION RIGID RED 1500CC (40EA/CA)

## (undated) DEVICE — SYSTEM NAVIGATION VIZADISC KNEE PROCEDURE TRACKING KIT (1EA)

## (undated) DEVICE — KIT DRAPE RIO ONE PIECE WITH POCKETS (1EA)

## (undated) DEVICE — SENSOR OXIMETER ADULT SPO2 RD SET (20EA/BX)

## (undated) DEVICE — STOCKINETTE IMPERVIOUS 12X48 - STERILELF (10/CA)"

## (undated) DEVICE — BLADE 90X18X1.27MM SAW SAGITTAL

## (undated) DEVICE — TUBING CLEARLINK DUO-VENT - C-FLO (48EA/CA)

## (undated) DEVICE — SUCTION INSTRUMENT YANKAUER BULBOUS TIP W/O VENT (50EA/CA)

## (undated) DEVICE — HUMID-VENT HEAT AND MOISTURE EXCHANGE- (50/BX)

## (undated) DEVICE — Device

## (undated) DEVICE — SUTURE 3-0 MONOCRYL PLUS PS-1 - 27 INCH (36/BX)

## (undated) DEVICE — GLOVE BIOGEL PI INDICATOR SZ 8.0 SURGICAL PF LF -(50/BX 4BX/CA)

## (undated) DEVICE — SET EXTENSION WITH 2 PORTS (48EA/CA) ***PART #2C8610 IS A SUBSTITUTE*****

## (undated) DEVICE — HANDPIECE 10FT INTPLS SCT PLS IRRIGATION HAND CONTROL SET (6/PK)

## (undated) DEVICE — SODIUM CHL IRRIGATION 0.9% 1000ML (12EA/CA)

## (undated) DEVICE — BLADE SAGITTAL SAW DUAL CUT 25.0 X 90.0 X 1.27MM (1/EA)

## (undated) DEVICE — DERMABOND ADVANCED - (12EA/BX)

## (undated) DEVICE — TIP INTPLS HFLO ML ORFC BTRY - (12/CS) FOR SURGILAV

## (undated) DEVICE — DRAPE LARGE 3 QUARTER - (20/CA)

## (undated) DEVICE — GLOVE BIOGEL PI ORTHO SZ 7.5 PF LF (40PR/BX)

## (undated) DEVICE — SUTURE 5 ETHIBOND V-37 (12PK/BX)

## (undated) DEVICE — SUTURE 2-0 MONOCRYL PLUS UNDYED CT-1 1 X 36 (36EA/BX)"

## (undated) DEVICE — PIN FIXATION BONE STERILE 3.2MM X 110MM (2EA/PK)

## (undated) DEVICE — SUTURE GENERAL

## (undated) DEVICE — PIN FIXATION BONE STERILE 3.2MM X 140MM (2EA/PK)

## (undated) DEVICE — BLADE SAGITTAL 34MM

## (undated) DEVICE — PAD PREP 24 X 48 CUFFED - (100/CA)

## (undated) DEVICE — LACTATED RINGERS INJ 1000 ML - (14EA/CA 60CA/PF)

## (undated) DEVICE — GLOVE BIOGEL SZ 8 SURGICAL PF LTX - (50PR/BX 4BX/CA)